# Patient Record
Sex: FEMALE | Race: WHITE | Employment: FULL TIME | URBAN - METROPOLITAN AREA
[De-identification: names, ages, dates, MRNs, and addresses within clinical notes are randomized per-mention and may not be internally consistent; named-entity substitution may affect disease eponyms.]

---

## 2017-05-19 ENCOUNTER — OFFICE VISIT (OUTPATIENT)
Dept: ONCOLOGY | Facility: CLINIC | Age: 37
End: 2017-05-19
Attending: GENETIC COUNSELOR, MS
Payer: COMMERCIAL

## 2017-05-19 DIAGNOSIS — Z80.3 FAMILY HISTORY OF MALIGNANT NEOPLASM OF BREAST: ICD-10-CM

## 2017-05-19 DIAGNOSIS — Z80.41 FAMILY HISTORY OF MALIGNANT NEOPLASM OF OVARY: ICD-10-CM

## 2017-05-19 DIAGNOSIS — Z80.0 FAMILY HISTORY OF COLON CANCER: ICD-10-CM

## 2017-05-19 DIAGNOSIS — Z80.41 FAMILY HISTORY OF MALIGNANT NEOPLASM OF OVARY: Primary | ICD-10-CM

## 2017-05-19 LAB — MISCELLANEOUS TEST: NORMAL

## 2017-05-19 PROCEDURE — 36415 COLL VENOUS BLD VENIPUNCTURE: CPT

## 2017-05-19 PROCEDURE — 96040 ZZH GENETIC COUNSELING, EACH 30 MINUTES: CPT | Mod: ZF | Performed by: GENETIC COUNSELOR, MS

## 2017-05-19 NOTE — PATIENT INSTRUCTIONS
Assessing Cancer Risk  Only about 5-10% of cancers are thought to be due to an inherited cancer susceptibility gene.    These families often have:    Several people with the same or related types of cancer    Cancers diagnosed at a young age (before age 50)    Individuals with more than one primary cancer    Multiple generations of the family affected with cancer    Some people may be candidates for genetic testing of more than one gene.  For these families, genetic testing using a multi-gene cancer panel may be offered.  These panels may test genes known to increase the risk for breast (and other) cancers: YONATAN, BRCA1, BRCA2, CDH1, CHEK2, PALB2, PTEN, and TP53.  The purpose of this handout is to serve as a brief summary of the high/moderate-risk breast cancer genes which have published clinical management guidelines for individuals who are found to carry a mutation.    Hereditary Breast and Ovarian Cancer Syndrome (HBOC)  A single mutation in one of the copies of BRCA1 or BRCA2 increases the risk for breast and ovarian cancer, among others.  The risk for pancreatic cancer and melanoma may also be slightly increased in some families.  The tables below list the chance that someone with a BRCA mutation would develop cancer in his or her lifetime1,2,3,4.          Women   Men     General Population BRCA+    General Population BRCA+   Breast  12% 40-80%  Breast <1% ~7%   Ovarian  1-2% 10-40%  Prostate 16% 20%     A person s ethnic background is also important to consider, as individuals of Ashkenazi Advent ancestry have a higher chance of having a BRCA gene mutation.  There are three BRCA mutations that occur more frequently in this population.    Li-Fraumeni Syndrome (LFS)  LFS is a cancer predisposition syndrome. Individuals with LFS are at an increased risk for developing cancer at a young age. The general lifetime risk for development of cancer is 50% by age 30 and 90% by age 60.  LFS is caused by a mutation in the  TP53 gene.  A single mutation in one of the copies of TP53 increases the risk for multiple cancers.     Core Cancers: Sarcomas, Breast, Brain, Lung, Leukemias/Lymphomas, Adrenocortical carcinomas  Other Cancers: Gastrointestinal, Thyroid, Skin, Genitourinary      Cowden Syndrome  Cowden syndrome is a hereditary condition that increases the risk for breast, thyroid, endometrial, and kidney cancer.  Cowden syndrome is caused by a mutation in the PTEN gene.  A single mutation in one of the copies of PTEN causes Cowden syndrome and increases cancer risk.  The table below shows the chance that someone with a PTEN mutation would develop cancer in their lifetime5,6.  Other benign features seen in some individuals with Cowden syndrome include benign skin lesions (facial papules, keratoses, lipomas), learning disability, autism, thyroid nodules, colon polyps, and larger head size.      Lifetime Cancer Risk   Cancer Type General Population Cowden Syndrome   Breast  12% 25-50%*   Thyroid  1% 35%   Renal 1-2% 35%   Endometrial  2-3% 28%   Colon 5% 9%   Melanoma 2-3% >5%     *One recent study found breast cancer risk to be increased to 85%    Hereditary Diffuse Gastric Cancer (HDGC)  Currently, one gene is known to cause hereditary diffuse gastric cancer: CDH1.  Individuals with HDGC are at increased risk for diffuse gastric cancer and lobular breast cancer. Of people diagnosed with HDGC, 30-50% have a mutation in the CDH1 gene.  This suggests there are likely other genes that may cause HDGC that have not been identified yet.      Lifetime Cancer Risks    General Pop HDGC    Diffuse Gastric  <1% ~80%   Breast 12% 39-52%     Additional Genes Associated with Increased Breast Cancer Risk  PALB2  Mutations in PALB2 have been shown to increase the risk of breast cancer up to 33-58% in some families; where individuals fall within this risk range is dependent upon family history.9 PALB2 mutations have also been associated with  increased risk for pancreatic cancer, although this risk has not been quantified yet.  Individuals who inherit two PALB2 mutations--one from their mother and one from their father--have a condition called Fanconi Anemia.  This rare autosomal recessive condition is associated with short stature, developmental delay, bone marrow failure, and increased risk for childhood cancers.    YONATAN  YONATAN is a moderate-risk breast cancer gene. Women who have a mutation in YONATAN can have between a 2-4 fold increased risk for breast cancer compared to the general population.10  YONATAN mutations have also been associated with increased risk for pancreatic cancer, however an estimate of this cancer risk is not well understood.11  Individuals who inherit two YONATAN mutations have a condition called ataxia-telangiectasia (AT).  This rare autosomal recessive condition affects the nervous system and immune system, and is associated with progressive cerebellar ataxia beginning in childhood.  Individuals with ataxia-telangiectasia often have a weakened immune system and have an increased risk for childhood cancers.           CHEK2   CHEK2 is a moderate-risk breast cancer gene.  Women who have a mutation in CHEK2 have around a 2-fold increased risk for breast cancer compared to the general population, and this risk may be higher depending upon family history.12,13,14  Mutations in CHEK2 have also been shown to increase the risk of a number of other cancers, including colon and prostate, however these cancer risks are currently not well understood.         Inheritance   All of the genes reviewed above are inherited in an autosomal dominant pattern.  This means that if a parent has a mutation, each of his or her children will have a 50% chance of inheriting that same mutation.  Therefore, each child--male or female--would have a 50% chance of being at increased risk for developing cancer.      Image obtained from [employer], 2013      Mutations in some genes can occur de ed, which means that a person s mutation occurred for the first time in them and was not inherited from a parent.  Now that they have the mutation, however, it can be passed on to future generations.    Genetic Testing  Genetic testing involves a blood test and will look for any harmful mutations within genes that are associated with increased cancer risk.  If possible, it is recommended that the person(s) who has had cancer be tested before other family members.  That person will give us the most useful information about whether or not a specific gene is associated with the cancer in the family.    Results  There are three possible results of genetic testing:    Positive--a harmful mutation was identified in one or more of the genes    Negative--no mutation was identified in any of the genes on this panel    Variant of unknown significance--a variation in one of the genes was identified, but it is unclear how this impacts cancer risk in the family    Advantages and Disadvantages  There are advantages and disadvantages to genetic testing.  Advantages    May clarify your cancer risk    Can help you make medical decisions    May explain the cancers in your family    May give useful information to your family members (if you share your results)    Disadvantages    Possible negative emotional impact of learning about inherited cancer risk    Uncertainty in interpreting a negative test result in some situations    Possible genetic discrimination concerns (see below)    Genetic Information Nondiscrimination Act (DMITRI)  DMITRI is a federal law that protects individuals from health insurance or employment discrimination based on a genetic test result alone.  Although rare, there are currently no legal discrimination protections in terms of life insurance, long term care, or disability insurances.  Visit the National Human Genome Research [place] genome.gov/17708839 to learn  more.    Reducing Cancer Risk  Each of the genes listed within this handout have nationally recognized cancer screening guidelines that would be recommended for individuals who test positive.  In addition to increased cancer screening, surgeries may be offered or recommended to reduce cancer risk.  Recommendations are based upon an individual s genetic test result as well as their personal and family history of cancer.    Questions to Think About Regarding Genetic Testing    What effect will the test result have on me and my relationship with my family members if I have an inherited gene mutation?  If I don t have a gene mutation?    Should I share my test results, and how will my family react to this news, which may also affect them?    Are my children ready to learn new information that may one day affect their own health?      Resources  FORCE: Facing Our Risk of Cancer Empowered facingourrisk.org   Bright Pink bebrightpink.org   Li-Fraumeni Syndrome Association lfsassociation.org   PTEN World PTENworld.com   No stomach for cancer, Inc. nostomachforcancer.org   Stomach cancer relief network scrnet.org   Collaborative Group of the Americas on Inherited Colorectal Cancer (CGA) cgaicc.com    Cancer Care cancercare.org   American Cancer Society (ACS) cancer.org   National Cancer Angie (NCI) cancer.gov     Cancer Risk Management Program [phone] ([phone])  ? Bere Cheryl, MS, [facility] [place]  [phone]  ? Kelsie Tyrone, MS, [facility] [place]  [phone]  ? Mali Brown, MS, [facility] [place]  [phone]  ? Qing Lowery, MS, [facility] [place]  [phone]  ? Griffin Booth, MS, [facility] [place]  [phone]    References  1. Rose Mercado PDP, Sharmin S, Loan MARIE, Mateus JE, Melchor JL, Tieraman N, Nelida H, Heather O, Cindi A, Ezekiel B, Souleymane P, Lizzette S, Ariela DM, North N, Bobbi E, April H, Suresh E, Leticia J, Cass J, Claudia B, Tulinius H, Thorlacius S, Eerola H, Bryan H, Jca K, Crow OP. Average risks of breast and ovarian  cancer associated with BRCA1 or BRCA2 mutations detected in case series unselected for family history: a combined analysis of 222 studies. Am J Hum Mirtha. 2003;72:1117-30.  2. Blanca N, Lluvia MATSON, Christophe G.  BRCA1 and BRCA2 Hereditary Breast and Ovarian Cancer. Gene Reviews online. 2013.  3. Aristeo YC, Shai S, Gerald G, Dahiana S. Breast cancer risk among male BRCA1 and BRCA2 mutation carriers. J Natl Cancer Inst. 2007;99:1811-4.  4. Lalo MCGUIRE, Vanessa I, Nemesio J, Gomez E, Monik ER, Amaya F. Risk of breast cancer in male BRCA2 carriers. J Med Mirtha. 2010;47:710-1.  5. Dada MH, Manolo J, Logan J, Giovanni LINO, Sonido MS, Haily C. Lifetime cancer risks in individuals with germline PTEN mutations. Clin Cancer Res. 2012;18:400-7.  6. Teddyki R. Cowden Syndrome: A Critical Review of the Clinical Literature. J Mirtha . 2009:18:13-27.  7. National Comprehensive Cancer Network. Clinical practice guidelines in oncology, colorectal cancer screening. Available online (registration required). 2013.  8. National Cancer [place]. SEER Cancer Stat Fact Sheets.  December 2013.  9. Sophia LIVINGSTON, et al. Breast-Cancer Risk in Families with Mutations in PALB2. NEJM. 2014; 371(6):497-506.  10. Leandro GREEN, Bhupendra D, Ale S, Clarita P, Radhai T, Ole M, Denzel B, Johnny H, Glenn R, Yolanda K, Roxy L, Lalo MCGUIRE, Ariela D, Indra DF, Rc MR, The Breast Cancer Susceptibility Collaboration ([place]) & Josue N. YONATAN mutations that cause ataxia-telangiectasia are breast cancer susceptibility alleles. Nature Genetics. 2006;38:873-875  11. Alfa N , Yeny Y, Ayala GUAN, Guillermina L, Eduardo GM , Josias ML, Regina S, Boucher AG, Syngal S, Shama ML, Stuart J , Antony R, Ephraim SZ, Laura JR, Pura VE, Tor M, Vogelstein B, Minda N, Kate RH, Gia KW, and Chandrakant AP. YONATAN mutations in patients with hereditary pancreatic cancer. Cancer Discover. 2012;2:41-46  12. CHEK2 Breast Cancer Case-Control Consortium.  CHEK2*1100delC and susceptibility to breast cancer: A collaborative analysis involving 10,860 breast cancer cases and 9,065 controls from 10 studies. Am J Hum Mirtha, 74 (2004), pp. 1913-5100  13. Tobias T, Maricruz S, Al K, et al. Spectrum of Mutations in BRCA1, BRCA2, CHEK2, and TP53 in Families at High Risk of Breast Cancer. SONDRA. 2006;295(12):4573-3441.   14. Barbara NIETO, Yina SHAHID, Rene GREEN, et al. Risk of breast cancer in women with a CHEK2 mutation with and without a family history of breast cancer. J Clin Oncol. 2011;29:6648-8943

## 2017-05-19 NOTE — LETTER
5/19/2017       RE: Krystyna Smith  [address] [facility] [address]     Dear Colleague,    Thank you for referring your patient, Krystyna Smith, to the [facility]. Please see a copy of my visit note below.    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 She is here today to review this history and available genetic testing options.    Personal History:  Krystyna is a 37 year old female.  She does not have any personal history of cancer.      She had her first menstrual period at age 14, her first child at age 25, and is premenopausal. Krystyna has her ovaries, fallopian tubes and uterus in place, and she has not had ovarian cancer screening to date.  She reports 10 years of oral contraceptive use, approximately 7-8 years of IUD use, and no use of hormone replacement therapy.      Her most recent clinical breast exam was on 09/09/2016 and she has not had mammograms. She has not had a colonoscopy. She does not regularly do any other cancer screening at this time.  Krystyna reported she quit smoking in 2005 and that he only has occasional use of alcohol.     Family History: (Please see scanned pedigree for detailed family history information) Per Krystyna, her family history with regards to cancer is significant for the following:    Krystyna's mother Brandt was diagnosed with ovarian cancer at age 62 and passed away last year at age 64. Brandt underwent surgeries and received chemotherapy. In August of 2015, Brandt received the BRCA MyRisk analysis genetic test, which consisted of 25 genes (BRCA1, BRCA2, PTEN, TP53, CDH1, STK11, PALB2, CHEK2, YONATAN, NBN, BARD1, BRIP1, RAD51C, MLH1, MSH2, MSH6, PMS2,  EPCAM, APC, MUTYH, CDKN2A, CDK4, BMPR1A, SMAD4, and RAD51D).    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 This information was found in the letter from Brandt's genetic counselor.     Krystyna's maternal aunt was diagnosed with breast cancer at age 74. She underwent double mastectomy and chemotherapy. She is reported to have had hysterectomy done. She reportedly had fertility issues and never had children. She is also reported to carry the same YONATAN c.2880del mutation.    Krystyna's maternal grandfather passed away at age 52 due to colon cancer.     Her maternal and paternal ethnicity is [place].  There is no known Ashkenazi Mormon ancestry on either side of her family. There is no reported consanguinity.    Discussion:    Based on Krystyna's mother's genetic test results, Krystyna has a 50% chance of also carrying the same genetic change in the YONATAN gene.    Based on this, Krystyna meets current National Comprehensive Cancer Network (NCCN) criteria for genetic testing of the familial YONATAN mutation.      We discussed the natural history and genetics of breast cancer, as well as YONATAN mutations.   - We reviewed that YONATAN is a moderate-risk breast cancer gene.  The risk for breast cancer in women with an YONATAN mutation are estimated to be at a 2-4 fold increased risk for breast cancer compared to the general population.  This is approximately 24-48%, based on the population risk for breast cancer of ~12%.  - We discussed the association of YONATAN mutations with increased risk for pancreatic cancer; however data is still limited in this area.  No screening recommendations have been made for YONATAN carriers regarding pancreatic cancer at this time.    - We also discussed that YONATAN mutations are generally not associated with ovarian cancer risk. Hence, the familial YONATAN mutation in Krystyna's family may not fully explain the diagnosis of ovarian cancer in Krystyna's mother.     We  discussed the implications for both positive and negative test results for Krystyna.    If testing comes back positive, Krystyna will be recommended to follow the screening recommendations for women with YONATAN mutations as published by the National Comprehensive Cancer Network (NCCN). omen with YONATAN mutations qualify for high risk screening, which involves breast MRI in addition to mammogram.  Double mastectomy may be considered based on family history.    We also discussed what a positive test result would mean for her son as well as reproductive implications of having an YONATAN mutation, as it pertains to having a child with ataxia-telangiectasia.  Ataxia-telangiectasia is a rare autosomal recessive disorder that typically presents in childhood, and affects the nervous system, immune system, and other systems. This condition occurs when a mother and father both have a mutation in the YONATAN gene and pass it on to a child.  Individuals with this disorder have trouble with balance and coordinating movements (ataxia).  Affected individuals also have widened blood vessels called telangiectasias. People with ataxia-telangiectasia have an increased risk of developing childhood cancers.     We decided to revisit ataxia-telangiectasia in more detail if Krystyna's results come back positive.     If testing comes back negative, Krystyna would not be at an increased risk for YONATAN-associated cancers. However, it was discussed again that as ovarian cancer isn't a cancer generally associated with YONATAN mutations, appropriate ovarian cancer screening may need to be discussed with her primary care provider. If testing comes back negative, as these mutations do not skip generations, Krystyna cannot pass on the familial YONATAN mutation to her son based on a negative test result.     A detailed handout regarding breast and ovarian cancer and the information we discussed was provided to Krystyna at the end of our appointment today and can be found  in the after visit summary.  Topics included: inheritance pattern, cancer risks, cancer screening recommendations, and also risks, benefits and limitations of testing.    Medical Management: The information from genetic testing may determine additional cancer screening recommendations (i.e. mammogram and breast MRI, more frequent colonoscopies, annual dermatologic exams, etc.) as well as options for risk reducing surgeries (i.e. bilateral mastectomy, surgery to remove the ovaries and/or uterus, etc.) for Krystyna and her relatives.  These recommendations will be discussed in detail once genetic testing is completed.    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  2) This information should be available in 4-6 weeks.  3) Krystyna will return to clinic to discuss the results    Face to face time: 45 minutes    Griffin Booth MS, [facility] [place]  Certified Genetic Counselor  T: [phone]  F: [phone]

## 2017-05-19 NOTE — PROGRESS NOTES
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 She is here today to review this history and available genetic testing options.    Personal History:  Krystyna is a 37 year old female.  She does not have any personal history of cancer.      She had her first menstrual period at age 14, her first child at age 25, and is premenopausal. Krystyna has her ovaries, fallopian tubes and uterus in place, and she has not had ovarian cancer screening to date.  She reports 10 years of oral contraceptive use, approximately 7-8 years of IUD use, and no use of hormone replacement therapy.      Her most recent clinical breast exam was on 09/09/2016 and she has not had mammograms. She has not had a colonoscopy. She does not regularly do any other cancer screening at this time.  Krystyna reported she quit smoking in 2005 and that he only has occasional use of alcohol.     Family History: (Please see scanned pedigree for detailed family history information) Per Krystyna, her family history with regards to cancer is significant for the following:    Krystyna's mother Brandt was diagnosed with ovarian cancer at age 62 and passed away last year at age 64. Brandt underwent surgeries and received chemotherapy. In August of 2015, Brandt received the BRCA MyRisk analysis genetic test, which consisted of 25 genes (BRCA1, BRCA2, PTEN, TP53, CDH1, STK11, PALB2, CHEK2, YONATAN, NBN, BARD1, BRIP1, RAD51C, MLH1, MSH2, MSH6, PMS2, EPCAM, APC, MUTYH, CDKN2A, CDK4, BMPR1A, SMAD4, and RAD51D).    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 This information was found in the letter from Brandt's genetic counselor.     Krystyna's  maternal aunt was diagnosed with breast cancer at age 74. She underwent double mastectomy and chemotherapy. She is reported to have had hysterectomy done. She reportedly had fertility issues and never had children. She is also reported to carry the same YONATAN c.2880del mutation.    Krystyna's maternal grandfather passed away at age 52 due to colon cancer.     Her maternal and paternal ethnicity is [place].  There is no known Ashkenazi Zoroastrianism ancestry on either side of her family. There is no reported consanguinity.    Discussion:    Based on Krystyna's mother's genetic test results, Krystyna has a 50% chance of also carrying the same genetic change in the YONATAN gene.    Based on this, Krystyna meets current National Comprehensive Cancer Network (NCCN) criteria for genetic testing of the familial YONATAN mutation.      We discussed the natural history and genetics of breast cancer, as well as YONATAN mutations.   - We reviewed that YONATAN is a moderate-risk breast cancer gene.  The risk for breast cancer in women with an YONATAN mutation are estimated to be at a 2-4 fold increased risk for breast cancer compared to the general population.  This is approximately 24-48%, based on the population risk for breast cancer of ~12%.  - We discussed the association of YONATAN mutations with increased risk for pancreatic cancer; however data is still limited in this area.  No screening recommendations have been made for YONATAN carriers regarding pancreatic cancer at this time.    - We also discussed that YONATAN mutations are generally not associated with ovarian cancer risk. Hence, the familial YONATAN mutation in Krystyna's family may not fully explain the diagnosis of ovarian cancer in Krystyna's mother.     We discussed the implications for both positive and negative test results for Krystyna.    If testing comes back positive, Krystyna will be recommended to follow the screening recommendations for women with YONATAN mutations as published by the National  Comprehensive Cancer Network (NCCN). omen with YONATAN mutations qualify for high risk screening, which involves breast MRI in addition to mammogram.  Double mastectomy may be considered based on family history.    We also discussed what a positive test result would mean for her son as well as reproductive implications of having an YONATAN mutation, as it pertains to having a child with ataxia-telangiectasia.  Ataxia-telangiectasia is a rare autosomal recessive disorder that typically presents in childhood, and affects the nervous system, immune system, and other systems. This condition occurs when a mother and father both have a mutation in the YONATAN gene and pass it on to a child.  Individuals with this disorder have trouble with balance and coordinating movements (ataxia).  Affected individuals also have widened blood vessels called telangiectasias. People with ataxia-telangiectasia have an increased risk of developing childhood cancers.     We decided to revisit ataxia-telangiectasia in more detail if Krystyna's results come back positive.     If testing comes back negative, Krystyna would not be at an increased risk for YONATAN-associated cancers. However, it was discussed again that as ovarian cancer isn't a cancer generally associated with YONATAN mutations, appropriate ovarian cancer screening may need to be discussed with her primary care provider. If testing comes back negative, as these mutations do not skip generations, Krystyna cannot pass on the familial YONATAN mutation to her son based on a negative test result.     A detailed handout regarding breast and ovarian cancer and the information we discussed was provided to Krystyna at the end of our appointment today and can be found in the after visit summary.  Topics included: inheritance pattern, cancer risks, cancer screening recommendations, and also risks, benefits and limitations of testing.    Medical Management: The information from genetic testing may determine  additional cancer screening recommendations (i.e. mammogram and breast MRI, more frequent colonoscopies, annual dermatologic exams, etc.) as well as options for risk reducing surgeries (i.e. bilateral mastectomy, surgery to remove the ovaries and/or uterus, etc.) for Krystyna and her relatives.  These recommendations will be discussed in detail once genetic testing is completed.    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  2) This information should be available in 4-6 weeks.  3) Krystyna will return to clinic to discuss the results    Face to face time: 45 minutes    Griffin Booth MS, [facility]  Certified Genetic Counselor  T: [phone]  F: [phone]

## 2017-05-19 NOTE — MR AVS SNAPSHOT
After Visit Summary   5/19/2017    Krystyna Smith    MRN: [id number]           Patient Information     Date Of Birth          1980        Visit Information        Provider Department      5/19/2017 2:15 PM Griffin Booth GC;  [address] CONSULT [facility]        Care Instructions      Assessing Cancer Risk  Only about 5-10% of cancers are thought to be due to an inherited cancer susceptibility gene.    These families often have:    Several people with the same or related types of cancer    Cancers diagnosed at a young age (before age 50)    Individuals with more than one primary cancer    Multiple generations of the family affected with cancer    Some people may be candidates for genetic testing of more than one gene.  For these families, genetic testing using a multi-gene cancer panel may be offered.  These panels may test genes known to increase the risk for breast (and other) cancers: YONATAN, BRCA1, BRCA2, CDH1, CHEK2, PALB2, PTEN, and TP53.  The purpose of this handout is to serve as a brief summary of the high/moderate-risk breast cancer genes which have published clinical management guidelines for individuals who are found to carry a mutation.    Hereditary Breast and Ovarian Cancer Syndrome (HBOC)  A single mutation in one of the copies of BRCA1 or BRCA2 increases the risk for breast and ovarian cancer, among others.  The risk for pancreatic cancer and melanoma may also be slightly increased in some families.  The tables below list the chance that someone with a BRCA mutation would develop cancer in his or her lifetime1,2,3,4.          Women   Men     General Population BRCA+    General Population BRCA+   Breast  12% 40-80%  Breast <1% ~7%   Ovarian  1-2% 10-40%  Prostate 16% 20%     A person s ethnic background is also important to consider, as individuals of Ashkenazi Sikhism ancestry have a higher chance of having a BRCA gene mutation.  There are three BRCA mutations that  occur more frequently in this population.    Li-Fraumeni Syndrome (LFS)  LFS is a cancer predisposition syndrome. Individuals with LFS are at an increased risk for developing cancer at a young age. The general lifetime risk for development of cancer is 50% by age 30 and 90% by age 60.  LFS is caused by a mutation in the TP53 gene.  A single mutation in one of the copies of TP53 increases the risk for multiple cancers.     Core Cancers: Sarcomas, Breast, Brain, Lung, Leukemias/Lymphomas, Adrenocortical carcinomas  Other Cancers: Gastrointestinal, Thyroid, Skin, Genitourinary      Cowden Syndrome  Cowden syndrome is a hereditary condition that increases the risk for breast, thyroid, endometrial, and kidney cancer.  Cowden syndrome is caused by a mutation in the PTEN gene.  A single mutation in one of the copies of PTEN causes Cowden syndrome and increases cancer risk.  The table below shows the chance that someone with a PTEN mutation would develop cancer in their lifetime5,6.  Other benign features seen in some individuals with Cowden syndrome include benign skin lesions (facial papules, keratoses, lipomas), learning disability, autism, thyroid nodules, colon polyps, and larger head size.      Lifetime Cancer Risk   Cancer Type General Population Cowden Syndrome   Breast  12% 25-50%*   Thyroid  1% 35%   Renal 1-2% 35%   Endometrial  2-3% 28%   Colon 5% 9%   Melanoma 2-3% >5%     *One recent study found breast cancer risk to be increased to 85%    Hereditary Diffuse Gastric Cancer (HDGC)  Currently, one gene is known to cause hereditary diffuse gastric cancer: CDH1.  Individuals with HDGC are at increased risk for diffuse gastric cancer and lobular breast cancer. Of people diagnosed with HDGC, 30-50% have a mutation in the CDH1 gene.  This suggests there are likely other genes that may cause HDGC that have not been identified yet.      Lifetime Cancer Risks    General Pop HDGC    Diffuse Gastric  <1% ~80%   Breast 12%  39-52%     Additional Genes Associated with Increased Breast Cancer Risk  PALB2  Mutations in PALB2 have been shown to increase the risk of breast cancer up to 33-58% in some families; where individuals fall within this risk range is dependent upon family history.9 PALB2 mutations have also been associated with increased risk for pancreatic cancer, although this risk has not been quantified yet.  Individuals who inherit two PALB2 mutations--one from their mother and one from their father--have a condition called Fanconi Anemia.  This rare autosomal recessive condition is associated with short stature, developmental delay, bone marrow failure, and increased risk for childhood cancers.    YONATAN  YONATAN is a moderate-risk breast cancer gene. Women who have a mutation in YONATAN can have between a 2-4 fold increased risk for breast cancer compared to the general population.10  YNOATAN mutations have also been associated with increased risk for pancreatic cancer, however an estimate of this cancer risk is not well understood.11  Individuals who inherit two YONATAN mutations have a condition called ataxia-telangiectasia (AT).  This rare autosomal recessive condition affects the nervous system and immune system, and is associated with progressive cerebellar ataxia beginning in childhood.  Individuals with ataxia-telangiectasia often have a weakened immune system and have an increased risk for childhood cancers.           CHEK2   CHEK2 is a moderate-risk breast cancer gene.  Women who have a mutation in CHEK2 have around a 2-fold increased risk for breast cancer compared to the general population, and this risk may be higher depending upon family history.12,13,14  Mutations in CHEK2 have also been shown to increase the risk of a number of other cancers, including colon and prostate, however these cancer risks are currently not well understood.         Inheritance   All of the genes reviewed above are inherited in an autosomal dominant  pattern.  This means that if a parent has a mutation, each of his or her children will have a 50% chance of inheriting that same mutation.  Therefore, each child--male or female--would have a 50% chance of being at increased risk for developing cancer.      Image obtained from Genetics Home Reference, 2013     Mutations in some genes can occur de ed, which means that a person s mutation occurred for the first time in them and was not inherited from a parent.  Now that they have the mutation, however, it can be passed on to future generations.    Genetic Testing  Genetic testing involves a blood test and will look for any harmful mutations within genes that are associated with increased cancer risk.  If possible, it is recommended that the person(s) who has had cancer be tested before other family members.  That person will give us the most useful information about whether or not a specific gene is associated with the cancer in the family.    Results  There are three possible results of genetic testing:    Positive--a harmful mutation was identified in one or more of the genes    Negative--no mutation was identified in any of the genes on this panel    Variant of unknown significance--a variation in one of the genes was identified, but it is unclear how this impacts cancer risk in the family    Advantages and Disadvantages  There are advantages and disadvantages to genetic testing.  Advantages    May clarify your cancer risk    Can help you make medical decisions    May explain the cancers in your family    May give useful information to your family members (if you share your results)    Disadvantages    Possible negative emotional impact of learning about inherited cancer risk    Uncertainty in interpreting a negative test result in some situations    Possible genetic discrimination concerns (see below)    Genetic Information Nondiscrimination Act (DMITRI)  DMITRI is a federal law that protects individuals from health  insurance or employment discrimination based on a genetic test result alone.  Although rare, there are currently no legal discrimination protections in terms of life insurance, long term care, or disability insurances.  Visit the National Human Genome Research [place] genome.gov/16706695 to learn more.    Reducing Cancer Risk  Each of the genes listed within this handout have nationally recognized cancer screening guidelines that would be recommended for individuals who test positive.  In addition to increased cancer screening, surgeries may be offered or recommended to reduce cancer risk.  Recommendations are based upon an individual s genetic test result as well as their personal and family history of cancer.    Questions to Think About Regarding Genetic Testing    What effect will the test result have on me and my relationship with my family members if I have an inherited gene mutation?  If I don t have a gene mutation?    Should I share my test results, and how will my family react to this news, which may also affect them?    Are my children ready to learn new information that may one day affect their own health?      Resources  FORCE: Facing Our Risk of Cancer Empowered facingourrisk.org   Bright Pink bebrightpink.org   Li-Fraumeni Syndrome Association lfsassociation.org   PTEN World PTENworld.Fugoo   No stomach for cancer, Inc. nostomachforcancer.org   Stomach cancer relief network scrnet.org   Collaborative Group of the Americas on Inherited Colorectal Cancer (CGA) cgaicc.com    Cancer Care cancercare.org   American Cancer Society (ACS) cancer.org   National Cancer [place] (NCI) cancer.gov     Cancer Risk Management Program [phone] ([phone])  ? Bere Luna, MS, [facility]  [phone]  ? Kelsie Hansen, MS, [facility]  [phone]  ? Mali Brown, MS, [facility]  [phone]  ? Qing Lowery, MS, [facility]  [phone]  ? Griffin Booth, MS, [facility]  [phone]    References  1. Rose Mercado PDP, Sharmin S, Loan MARIE,  Mateus JE, Melchor JL, Srikanth N, Nelida H, Heather O, Cindi A, Ezekiel B, Souleymane P, Lizzette S, Ariela DM, Kat N, Bobbi E, April H, uSresh E, Leticia J, Cass J, Claudia B, Tulinius H, Thorlacius S, Eerola H, Nevanlinna H, Jac K, Crow OP. Average risks of breast and ovarian cancer associated with BRCA1 or BRCA2 mutations detected in case series unselected for family history: a combined analysis of 222 studies. Am J Hum Mirtha. 2003;72:1117-30.  2. Blanca N, Lluvia M, Christophe G.  BRCA1 and BRCA2 Hereditary Breast and Ovarian Cancer. Gene Reviews online. 2013.  3. Aristeo YC, Shai S, Gerald G, Talbot S. Breast cancer risk among male BRCA1 and BRCA2 mutation carriers. J Natl Cancer Inst. 2007;99:1811-4.  4. Lalo MCGUIRE, Vanessa I, Nemesio J, Gomez E, Monik ER, Amaya F. Risk of breast cancer in male BRCA2 carriers. J Med Mirtha. 2010;47:710-1.  5. Garland MH, Manolo J, Logan J, Giovanni LA, Sonido MS, Haily C. Lifetime cancer risks in individuals with germline PTEN mutations. Clin Cancer Res. 2012;18:400-7.  6. Pilarski R. Cowden Syndrome: A Critical Review of the Clinical Literature. J Mirtha . 2009:18:13-27.  7. National Comprehensive Cancer Network. Clinical practice guidelines in oncology, colorectal cancer screening. Available online (registration required). 2013.  8. National Cancer [place]. SEER Cancer Stat Fact Sheets.  December 2013.  9. Sophia LIVINGSTON, et al. Breast-Cancer Risk in Families with Mutations in PALB2. NEJM. 2014; 371(6):497-506.  10. Leandro GREEN, Bhupendra SHAHID, Ale S, Clarita P, Holden T, Ole M, Denzel B, Johnny H, Glenn R, Yolanda K, Roxy L, Lalo MCGUIRE, Ariela SHAHID, Indra DF, Rc MR, The Breast Cancer Susceptibility Collaboration (UK) & Josue ROWLEY. YONATAN mutations that cause ataxia-telangiectasia are breast cancer susceptibility alleles. Nature Genetics. 2006;38:873-875  11. Alfa N , Yeny Y, Ayala J, Guillermina L, Eduardo JIMÉNEZ , Josias ML, Regina S, Sander WISE,  Tiana S, Shama ML, Stuart J , Antony R, Ephraim SZ, Laura JR, Puar VE, Tor M, Vogelstein B, Minda N, Kate RH, Gia KW, and Chandrakant AP. YONATAN mutations in patients with hereditary pancreatic cancer. Cancer Discover. 2012;2:41-46  12. CHEK2 Breast Cancer Case-Control Consortium. CHEK2*1100delC and susceptibility to breast cancer: A collaborative analysis involving 10,860 breast cancer cases and 9,065 controls from 10 studies. Am J Hum Mirtha, 74 (2004), pp. 4140-5678  13. Tobias T, Maricruz S, Al K, et al. Spectrum of Mutations in BRCA1, BRCA2, CHEK2, and TP53 in Families at High Risk of Breast Cancer. SONDRA. 2006;295(12):7677-3234.   14. Barbara C, Yina D, Rene A, et al. Risk of breast cancer in women with a CHEK2 mutation with and without a family history of breast cancer. J Clin Oncol. 2011;29:7886-9077            Follow-ups after your visit        Your next 10 appointments already scheduled     May 19, 2017  3:45 PM [facility]   T[employer] Lab Draw with  [employer] LAB DRAW   [facility] Lab Draw ([facility] [[email or web address]] Surgery [employer])    [address] [place] [id number] [phone]              Who to contact     If you have questions or need follow up information about today's clinic visit or your schedule please contact [facility] directly at [phone].  Normal or non-critical lab and imaging results will be communicated to you by MyChart, letter or phone within 4 business days after the clinic has received the results. If you do not hear from us within 7 days, please contact the clinic through MyChart or phone. If you have a critical or abnormal lab result, we will notify you by phone as soon as possible.  Submit refill requests through [employer] or call your pharmacy and they will forward the refill request to us. Please allow 3 business days for your refill to be completed.          Additional Information About Your Visit         [employer] Information     [employer] gives you secure access to your electronic health record. If you see a primary care provider, you can also send messages to your care team and make appointments. If you have questions, please call your primary care clinic.  If you do not have a primary care provider, please call [phone] and they will assist you.        Care EveryWhere ID     This is your Care EveryWhere ID. This could be used by other organizations to access your [place] medical records  [id number]         Blood Pressure from Last 3 Encounters:   09/09/16 115/79   02/22/16 104/68   11/13/15 104/62    Weight from Last 3 Encounters:   09/09/16 49.3 kg (108 lb 9.6 oz)   02/22/16 47.5 kg (104 lb 12.8 oz)   11/13/15 46.3 kg (102 lb)              Today, you had the following     No orders found for display       Primary Care Provider Office Phone # Fax #    CORINNE Luque [facility] [phone]       [facility] [address] [place] [address]        Thank you!     Thank you for choosing [facility]  for your care. Our goal is always to provide you with excellent care. Hearing back from our patients is one way we can continue to improve our services. Please take a few minutes to complete the written survey that you may receive in the mail after your visit with us. Thank you!             Your Updated Medication List - Protect others around you: Learn how to safely use, store and throw away your medicines at www.disposemymeds.org.          This list is accurate as of: 5/19/17  3:13 PM.  Always use your most recent med list.                   Brand Name Dispense Instructions for use    MULTIVITAMIN PO          NO ACTIVE MEDICATIONS      .       UNABLE TO FIND      MEDICATION NAME: a drink with many vitamins

## 2017-05-19 NOTE — NURSING NOTE
Chief Complaint   Patient presents with     Blood Draw     labs only via vpt in left AC   See flowsheets.  Margo Blank, CMA

## 2017-05-19 NOTE — LETTER
Cancer Risk Management  Program Locations    [facility]  [facility]  [facility]  Mailing Address  Cancer Risk Management Program  [facility]  [address]  [place] [address]    New patient appointments  [phone]  May 22, 2017    Krystyna MELTON  [place] [address]      Dear Krystyna,    It was a pleasure meeting with you at [facility] on 05/19/2017.  Here is a copy of the progress note from your recent genetic counseling visit to the Cancer Risk Management Program.  If you have any additional questions, please feel free to call.    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 She is here today to review this history and available genetic testing options.    Personal History:  Krystyna is a 37 year old female.  She does not have any personal history of cancer.      She had her first menstrual period at age 14, her first child at age 25, and is premenopausal. Krystyna has her ovaries, fallopian tubes and uterus in place, and she has not had ovarian cancer screening to date.  She reports 10 years of oral contraceptive use, approximately 7-8 years of IUD use, and no use of hormone replacement therapy.      Her most recent clinical breast exam was on 09/09/2016 and she has not had mammograms. She has not had a colonoscopy. She does not regularly do any other cancer screening at this time.  Krystyna reported she quit smoking in 2005 and that he only has occasional use of alcohol.     Family History:  (Please see scanned pedigree for detailed family history information) Per Krystyna, her family history with regards to cancer is significant for the following:    Krystyna's mother Brandt was diagnosed with ovarian cancer at age 62 and passed away last year at age 64. Brandt underwent surgeries and received chemotherapy. In August of 2015, Brandt received the BRCA Geo[employer] analysis genetic test, which consisted of 25 genes (BRCA1, BRCA2, PTEN, TP53, CDH1, STK11, PALB2, CHEK2, YONATAN, NBN, BARD1, BRIP1, RAD51C, MLH1, MSH2, MSH6, PMS2, EPCAM, APC, MUTYH, CDKN2A, CDK4, BMPR1A, SMAD4, and RAD51D).    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 This information was found in the letter from Brandt's genetic counselor.     Krystyna's maternal aunt was diagnosed with breast cancer at age 74. She underwent double mastectomy and chemotherapy. She is reported to have had hysterectomy done. She reportedly had fertility issues and never had children. She is also reported to carry the same YONATAN c.2880del mutation.    Krystyna's maternal grandfather passed away at age 52 due to colon cancer.     Her maternal and paternal ethnicity is Chinese/Citizen of the [place].  There is no known Ashkenazi Confucianism ancestry on either side of her family. There is no reported consanguinity.    Discussion:    Based on Krystyna's mother's genetic test results, Krystyna has a 50% chance of also carrying the same genetic change in the YONATAN gene.    Based on this, Krystyna meets current National Comprehensive Cancer Network (NCCN) criteria for genetic testing of the familial YONATAN mutation.      We discussed the natural history and genetics of breast cancer, as well as YONATAN mutations.   - We reviewed that YONATAN is a moderate-risk breast cancer gene.  The risk for breast cancer in women with an YONATAN mutation are estimated to be at a 2-4 fold increased risk for breast cancer compared to the general population.  This is approximately 24-48%,  based on the population risk for breast cancer of ~12%.  - We discussed the association of YONATAN mutations with increased risk for pancreatic cancer; however data is still limited in this area.  No screening recommendations have been made for YONATAN carriers regarding pancreatic cancer at this time.    - We also discussed that YONATAN mutations are generally not associated with ovarian cancer risk. Hence, the familial YONATAN mutation in Krystyna's family may not fully explain the diagnosis of ovarian cancer in Krystyna's mother.     We discussed the implications for both positive and negative test results for Krystyna.    If testing comes back positive, Krystyna will be recommended to follow the screening recommendations for women with YONATAN mutations as published by the National Comprehensive Cancer Network (NCCN). omen with YONATAN mutations qualify for high risk screening, which involves breast MRI in addition to mammogram.  Double mastectomy may be considered based on family history.    We also discussed what a positive test result would mean for her son as well as reproductive implications of having an YONATAN mutation, as it pertains to having a child with ataxia-telangiectasia.  Ataxia-telangiectasia is a rare autosomal recessive disorder that typically presents in childhood, and affects the nervous system, immune system, and other systems. This condition occurs when a mother and father both have a mutation in the YONATAN gene and pass it on to a child.  Individuals with this disorder have trouble with balance and coordinating movements (ataxia).  Affected individuals also have widened blood vessels called telangiectasias. People with ataxia-telangiectasia have an increased risk of developing childhood cancers.     We decided to revisit ataxia-telangiectasia in more detail if Krystyna's results come back positive.     If testing comes back negative, Krystyna would not be at an increased risk for YONATAN-associated cancers. However, it was  discussed again that as ovarian cancer isn't a cancer generally associated with YONATAN mutations, appropriate ovarian cancer screening may need to be discussed with her primary care provider. If testing comes back negative, as these mutations do not skip generations, Krystyna cannot pass on the familial YONATAN mutation to her son based on a negative test result.     A detailed handout regarding breast and ovarian cancer and the information we discussed was provided to Krystyna at the end of our appointment today and can be found in the after visit summary.  Topics included: inheritance pattern, cancer risks, cancer screening recommendations, and also risks, benefits and limitations of testing.    Medical Management: The information from genetic testing may determine additional cancer screening recommendations (i.e. mammogram and breast MRI, more frequent colonoscopies, annual dermatologic exams, etc.) as well as options for risk reducing surgeries (i.e. bilateral mastectomy, surgery to remove the ovaries and/or uterus, etc.) for Krystyna and her relatives.  These recommendations will be discussed in detail once genetic testing is completed.    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  2) This information should be available in 4-6 weeks.  3) Krystyna will return to clinic to discuss the results    Face to face time: 45 minutes    Griffin Booth MS, [facility]  Certified Genetic Counselor  T: [phone]  F: [phone]

## 2017-06-16 ENCOUNTER — OFFICE VISIT (OUTPATIENT)
Dept: ONCOLOGY | Facility: CLINIC | Age: 37
End: 2017-06-16
Attending: GENETIC COUNSELOR, MS
Payer: COMMERCIAL

## 2017-06-16 VITALS
DIASTOLIC BLOOD PRESSURE: 75 MMHG | HEART RATE: 58 BPM | TEMPERATURE: 97.3 F | WEIGHT: 110.7 LBS | SYSTOLIC BLOOD PRESSURE: 113 MMHG | BODY MASS INDEX: 21.73 KG/M2 | OXYGEN SATURATION: 99 % | HEIGHT: 60 IN

## 2017-06-16 DIAGNOSIS — Z84.81 FAMILY HISTORY OF GENETIC DISEASE CARRIER: ICD-10-CM

## 2017-06-16 DIAGNOSIS — Z80.41 FAMILY HISTORY OF MALIGNANT NEOPLASM OF OVARY: Primary | ICD-10-CM

## 2017-06-16 LAB — LAB SCANNED RESULT: NORMAL

## 2017-06-16 PROCEDURE — 40000072 ZZH STATISTIC GENETIC COUNSELING, < 16 MIN: Mod: ZF | Performed by: GENETIC COUNSELOR, MS

## 2017-06-16 NOTE — NURSING NOTE
Oncology Rooming Note    June 16, 2017 3:27 PM   Krystyna Smith is a 37 year old female who presents for:    Chief Complaint   Patient presents with     Oncology Clinic Visit     Results of genetic counseling      Initial Vitals: /75  Pulse 58  Temp 97.3  F (36.3  C)  Ht 1.524 m (5')  Wt 50.2 kg (110 lb 11.2 oz)  SpO2 99%  BMI 21.62 kg/m2 Estimated body mass index is 21.62 kg/(m^2) as calculated from the following:    Height as of this encounter: 1.524 m (5').    Weight as of this encounter: 50.2 kg (110 lb 11.2 oz). Body surface area is 1.46 meters squared.  Data Unavailable Comment: Data Unavailable   No LMP recorded. Patient is not currently having periods (Reason: UNKNOWN).  Allergies reviewed: Yes  Medications reviewed: Yes    Medications: Medication refills not needed today.  Pharmacy name entered into EmergentDetection:    [facility] - [place], [place] - [address]  [id number] - [place], [place] - [address] AT [facility] [address]    Clinical concerns: None  Yes was notified.    10 minutes for nursing intake (face to face time)     Alda Tijerina CMA

## 2017-06-16 NOTE — PATIENT INSTRUCTIONS
Negative Genetic Test Result    Genetic Testing  You had a blood test that looked at the genetic information in one or more genes associated with increased cancer risk.  The testing looked for any harmful changes that would stop this particular gene from working like it should. If an individual does not have any harmful changes or variants of unknown significance found from their blood test, their genetic test result is reported as negative.       Results  The genetic test did not identify any pathogenic (harmful) changes in the genes that were tested. There are several possible explanations for a negative test result. Without knowing the gene mutation in your family, the cause of the cancer in you or your relatives is still unknown. Your genetic counselor can help interpret the result for you and your relatives. In this case, there are several reasons that may explain the negative test result:    There may be a gene mutation in the family that you did not inherit.     You may have a gene mutation in a different gene that was not included in the test, or has not yet been discovered.     The cancers in you or your family may be due to a combination of genetic factors and environment (multifactorial/familial).    The cancers in you or your family may be sporadic/random cancers.    There is very small chance that a mutation was not found by current testing methods.  As testing technology evolves over time, it may still be possible to identify a mutation in a gene that was not found on this test.    It is important to note which genes were included in your test. A list of these genes can be found on your test result.    Screening Recommendations  Due to this negative test result, cancer screening recommendations should be based on your personal and family history. This may include increased cancer screening for you and/or your family members. Your genetic counselor and health care provider can help make  appropriate recommendations.      Please call us if you have any questions or concerns.     Cancer Risk Management Program  [phone] ([phone])  ? Bere Luna, MS, [employer]  [phone]  ? Kelsie Hansen, MS, [employer]  [phone]  ? Mali Brown, MS, [employer]  [phone]  ? Qing Lowery, MS, [employer]  [phone]  ? Griffin Booth, MS, [employer]  [phone]

## 2017-06-16 NOTE — LETTER
"6/16/2017       RE: Krystyna Smith  [address]  [place] [address]     Dear Colleague,    Thank you for referring your patient, Krystyna Smith, to the [facility]. Please see a copy of my visit note below.    6/16/2017    Referring Provider: Mehreen Dempsey CNP    Presenting Information:  Krystyna Smith returned to the Cancer Risk Management Program at [facility] to discuss her genetic testing results. Her blood was drawn on 05/19/2017 and we ordered site-specific testing for known YONATAN mutation in the family from [employer]. This testing was done because of her mother's positive genetic test result for the YONATAN mutation c.2880delC (p.Mop162Hjwxu*10)    Genetic Testing Results: NEGATIVE   Krystyna's test results were negative.  The mutation that was identified in her mother was in the YONATAN gene.  Specifically this mutation is called c.2880delC (p.Bbz560Hijym*10). Krystyna does not have the mutation previously identified in her mother. This test only looked for this one mutation.    A copy of the test report can be found in the Laboratory tab, dated 05/19/2017, and named \"SEND OUTS MISC TEST\". The report is scanned in as a linked document.    Interpretation:  Based on this test result, Krystyna is not at an increased risk for YONATAN-associated cancers.  Even though she does not have the familial YONATAN mutation, she is still at general population lifetime risk for breast cancer.     Also, as YONATAN mutations are typically not associated with ovarian cancer risk, we discussed that Krystyna is still at a slightly increased risk for ovarian cancer based on her mother's ovarian cancer diagnosis. Women with first degree relative with ovarian cancer have about 5-9% lifetime risk of ovarian cancer compared to the general population risk of about 1.6% lifetime risk.     Screening:  We discussed the importance of having routine cancer screening based on personal and " family history:    Krystyna's lifetime risk of breast cancer is estimated to be 11.9% based on the EDWIGE Risk Evaluation v8. This model takes into account different factors including Krystyna's age, age at menarche, age at first birth, height, weight, and family history of breast and ovarian cancer to calculate Krystyna's lifetime risk of breast cancer. Krystyna's lifetime risk is comparable with the general population risk. General population risk for breast cancer is about 12%. The recommendation is for women to have a breast mammogram annually beginning at age 40 years.     Due to Krystyna's family history of ovarian cancer, Krystyna and other close female relatives of the family member who had ovarian cancer remain at slightly increased risk for ovarian cancer.  We discussed available ovarian cancer screening (pelvic exams, CA-125 blood tests, and transvaginal ultrasounds) as well as the significant limitations of this screening.  As such, this screening is not typically recommended.  That being said, women in this family should discuss this screening and the signs and symptoms of ovarian cancer with their primary OB/GYN provider, as they may have individualized recommendations.    Inheritance:  We reviewed the autosomal dominant inheritance of YONATAN gene mutations.  We discussed that Krystyna cannot pass on this familial mutation to her children based on this test result. Mutations in this gene do not skip generations.      Plan:  1.  I provided Krystyna with a copy of her genetic test results today as well as my contact information should other family members want to consider genetic testing.   2.  If there are any further questions or concerns, she should not hesitate to contact me at [phone].    Face to face time: 15 minutes.    Griffin Booth MS, [facility]  Certified Genetic Counselor  T: [phone]  F: [phone]

## 2017-06-16 NOTE — LETTER
"    Cancer Risk Management  Program Locations    [facility]  [facility]  [facility]  Mailing Address  Cancer Risk Management Program  [facility]  [address]  [place] [address]    New patient appointments  [phone]  June 19, 2017    Krystyna Smith  [address]  [place] [address]      Dear Krystyna,    It was a pleasure meeting with you at [facility] on 06/16/2017. Here is a copy of the progress note from your recent genetic counseling visit to the Cancer Risk Management Program.  If you have any additional questions, please feel free to call.      6/16/2017    Referring Provider: Mehreen Dempsey CNP    Presenting Information:  Krystyna Smith returned to the Cancer Risk Management Program at [facility] to discuss her genetic testing results. Her blood was drawn on 05/19/2017 and we ordered site-specific testing for known YONATAN mutation in the family from [facility] [facility] [employer]. This testing was done because of her mother's positive genetic test result for the YONATAN mutation c.2880delC (p.Vgh180Tztdo*10)    Genetic Testing Results: NEGATIVE   Krystyna's test results were negative.  The mutation that was identified in her mother was in the YONATAN gene.  Specifically this mutation is called c.2880delC (p.Ikn773Aixhb*10). Krystyna does not have the mutation previously identified in her mother. This test only looked for this one mutation.    A copy of the test report can be found in the Laboratory tab, dated 05/19/2017, and named \"SEND OUTS MISC TEST\". The report is scanned in as a linked document.    Interpretation:  Based on this test result, Krystyna is not at an increased risk for YONATAN-associated cancers.  Even though she does not have the familial YONATAN mutation, she is still at general " population lifetime risk for breast cancer.     Also, as YONATAN mutations are typically not associated with ovarian cancer risk, we discussed that Krystyna is still at a slightly increased risk for ovarian cancer based on her mother's ovarian cancer diagnosis. Women with first degree relative with ovarian cancer have about 5-9% lifetime risk of ovarian cancer compared to the general population risk of about 1.6% lifetime risk.     Screening:  We discussed the importance of having routine cancer screening based on personal and family history:    Luisas lifetime risk of breast cancer is estimated to be 11.9% based on the EDWIGE Risk Evaluation v8. This model takes into account different factors including Krystyna's age, age at menarche, age at first birth, height, weight, and family history of breast and ovarian cancer to calculate Krystyna's lifetime risk of breast cancer. Krystyna's lifetime risk is comparable with the general population risk. General population risk for breast cancer is about 12%. The recommendation is for women to have a breast mammogram annually beginning at age 40 years.     Due to Krystyna's family history of ovarian cancer, Krystyna and other close female relatives of the family member who had ovarian cancer remain at slightly increased risk for ovarian cancer.  We discussed available ovarian cancer screening (pelvic exams, CA-125 blood tests, and transvaginal ultrasounds) as well as the significant limitations of this screening.  As such, this screening is not typically recommended.  That being said, women in this family should discuss this screening and the signs and symptoms of ovarian cancer with their primary OB/GYN provider, as they may have individualized recommendations.    Inheritance:  We reviewed the autosomal dominant inheritance of YONATAN gene mutations.  We discussed that Krystyna cannot pass on this familial mutation to her children based on this test result. Mutations in this gene  do not skip generations.      Plan:  1.  I provided Krystyna with a copy of her genetic test results today as well as my contact information should other family members want to consider genetic testing.   2.  If there are any further questions or concerns, she should not hesitate to contact me at [phone].    Face to face time: 15 minutes.    Griffin Booth MS, [facility]  Certified Genetic Counselor  T: [phone]  F: [phone]

## 2017-06-16 NOTE — MR AVS SNAPSHOT
After Visit Summary   6/16/2017    Krystyna Smith    MRN: [id number]           Patient Information     Date Of Birth          1980        Visit Information        Provider Department      6/16/2017 3:30 PM Griffin Booth GC;  [facility]ULT formerly [facility]        Care Instructions            Negative Genetic Test Result    Genetic Testing  You had a blood test that looked at the genetic information in one or more genes associated with increased cancer risk.  The testing looked for any harmful changes that would stop this particular gene from working like it should. If an individual does not have any harmful changes or variants of unknown significance found from their blood test, their genetic test result is reported as negative.       Results  The genetic test did not identify any pathogenic (harmful) changes in the genes that were tested. There are several possible explanations for a negative test result. Without knowing the gene mutation in your family, the cause of the cancer in you or your relatives is still unknown. Your genetic counselor can help interpret the result for you and your relatives. In this case, there are several reasons that may explain the negative test result:    There may be a gene mutation in the family that you did not inherit.     You may have a gene mutation in a different gene that was not included in the test, or has not yet been discovered.     The cancers in you or your family may be due to a combination of genetic factors and environment (multifactorial/familial).    The cancers in you or your family may be sporadic/random cancers.    There is very small chance that a mutation was not found by current testing methods.  As testing technology evolves over time, it may still be possible to identify a mutation in a gene that was not found on this test.    It is important to note which genes were included in your test. A list of these genes can be  found on your test result.    Screening Recommendations  Due to this negative test result, cancer screening recommendations should be based on your personal and family history. This may include increased cancer screening for you and/or your family members. Your genetic counselor and health care provider can help make appropriate recommendations.      Please call us if you have any questions or concerns.     Cancer Risk Management Program  [phone] ([phone]  ? Bere Luna, MS, [facility]  [phone]  ? Kelsie Tyrone, MS, [facility]  [phone]  ? Mali Kevin, MS, [facility]  [phone]  ? Qing Montserratandrew, MS, [facility]  [phone]  ? Griffin Booth, MS, [facility]  [phone]          Follow-ups after your visit        Your next 10 appointments already scheduled     Jun 16, 2017  3:30 PM CDT   (Arrive by 3:15 PM)   RETURN WITH ROOM with Griffin Booth GC,  [address] [facility]SULT [facility] ([facility] [place])    [address] [place] [id number] [phone]              Who to contact     If you have questions or need follow up information about today's clinic visit or your schedule please contact [facility] directly at [phone].  Normal or non-critical lab and imaging results will be communicated to you by Mygisticshart, letter or phone within 4 business days after the clinic has received the results. If you do not hear from us within 7 days, please contact the clinic through Mygisticshart or phone. If you have a critical or abnormal lab result, we will notify you by phone as soon as possible.  Submit refill requests through Re Pet or call your pharmacy and they will forward the refill request to us. Please allow 3 business days for your refill to be completed.          Additional Information About Your Visit        Re Pet Information     Re Pet gives you secure access to your electronic health record. If you see a primary care provider, you can also send  messages to your care team and make appointments. If you have questions, please call your primary care clinic.  If you do not have a primary care provider, please call [phone] and they will assist you.        Care EveryWhere ID     This is your Care EveryWhere ID. This could be used by other organizations to access your [facility] medical records  [id number]        Your Vitals Were     Pulse Temperature Height Pulse Oximetry BMI (Body Mass Index)       58 97.3  F (36.3  C) 1.524 m (5') 99% 21.62 kg/m2        Blood Pressure from Last 3 Encounters:   06/16/17 113/75   09/09/16 115/79   02/22/16 104/68    Weight from Last 3 Encounters:   06/16/17 50.2 kg (110 lb 11.2 oz)   09/09/16 49.3 kg (108 lb 9.6 oz)   02/22/16 47.5 kg (104 lb 12.8 oz)              Today, you had the following     No orders found for display       Primary Care Provider Office Phone # Fax #    CORINNE Luque [facility] [phone]       [facility] [address] [place] [address]        Thank you!     Thank you for choosing [facility]  for your care. Our goal is always to provide you with excellent care. Hearing back from our patients is one way we can continue to improve our services. Please take a few minutes to complete the written survey that you may receive in the mail after your visit with us. Thank you!             Your Updated Medication List - Protect others around you: Learn how to safely use, store and throw away your medicines at www.disposemymeds.org.          This list is accurate as of: 6/16/17  3:28 PM.  Always use your most recent med list.                   Brand Name Dispense Instructions for use    MULTIVITAMIN PO          NO ACTIVE MEDICATIONS      .       UNABLE TO FIND      MEDICATION NAME: a drink with many vitamins

## 2017-06-16 NOTE — Clinical Note
Please print and send a copy of this letter and the patient's genetic testing report to the patient.    Please enclose test report: Send outs misc test [[id number]] (Order [id number])

## 2017-06-16 NOTE — PROGRESS NOTES
"6/16/2017    Referring Provider: Mehreen Dempsey CNP    Presenting Information:  Krystyna Smith returned to the Cancer Risk Management Program at [facility] to discuss her genetic testing results. Her blood was drawn on 05/19/2017 and we ordered site-specific testing for known YONATAN mutation in the family from [employer]. This testing was done because of her mother's positive genetic test result for the YONATAN mutation c.2880delC (p.Iqu536Milke*10)    Genetic Testing Results: NEGATIVE   Krystyna's test results were negative.  The mutation that was identified in her mother was in the YONATAN gene.  Specifically this mutation is called c.2880delC (p.Veh980Souor*10). Krystyna does not have the mutation previously identified in her mother. This test only looked for this one mutation.    A copy of the test report can be found in the Laboratory tab, dated 05/19/2017, and named \"SEND OUTS MISC TEST\". The report is scanned in as a linked document.    Interpretation:  Based on this test result, Krystyna is not at an increased risk for YONATAN-associated cancers.  Even though she does not have the familial YONATAN mutation, she is still at general population lifetime risk for breast cancer.     Also, as YONATAN mutations are typically not associated with ovarian cancer risk, we discussed that Krystyna is still at a slightly increased risk for ovarian cancer based on her mother's ovarian cancer diagnosis. Women with first degree relative with ovarian cancer have about 5-9% lifetime risk of ovarian cancer compared to the general population risk of about 1.6% lifetime risk.     Screening:  We discussed the importance of having routine cancer screening based on personal and family history:    Luisas lifetime risk of breast cancer is estimated to be 11.9% based on the EDWIGE Risk Evaluation v8. This model takes into account different factors including Krystyna's age, age at menarche, age at first birth, height, " weight, and family history of breast and ovarian cancer to calculate Krystyna's lifetime risk of breast cancer. Krystyna's lifetime risk is comparable with the general population risk. General population risk for breast cancer is about 12%. The recommendation is for women to have a breast mammogram annually beginning at age 40 years.     Due to Krystyna's family history of ovarian cancer, Krystyna and other close female relatives of the family member who had ovarian cancer remain at slightly increased risk for ovarian cancer.  We discussed available ovarian cancer screening (pelvic exams, CA-125 blood tests, and transvaginal ultrasounds) as well as the significant limitations of this screening.  As such, this screening is not typically recommended.  That being said, women in this family should discuss this screening and the signs and symptoms of ovarian cancer with their primary OB/GYN provider, as they may have individualized recommendations.    Inheritance:  We reviewed the autosomal dominant inheritance of YONATAN gene mutations.  We discussed that Krystyna cannot pass on this familial mutation to her children based on this test result. Mutations in this gene do not skip generations.      Plan:  1.  I provided Krystyna with a copy of her genetic test results today as well as my contact information should other family members want to consider genetic testing.   2.  If there are any further questions or concerns, she should not hesitate to contact me at [phone].    Face to face time: 15 minutes.    Griffin Booth MS, [facility]  Certified Genetic Counselor  T: [phone]  F: [phone]

## 2017-06-27 ENCOUNTER — OFFICE VISIT (OUTPATIENT)
Dept: FAMILY MEDICINE | Facility: CLINIC | Age: 37
End: 2017-06-27
Payer: COMMERCIAL

## 2017-06-27 VITALS
OXYGEN SATURATION: 97 % | BODY MASS INDEX: 21.68 KG/M2 | HEIGHT: 60 IN | DIASTOLIC BLOOD PRESSURE: 65 MMHG | SYSTOLIC BLOOD PRESSURE: 99 MMHG | HEART RATE: 65 BPM | WEIGHT: 110.4 LBS | TEMPERATURE: 96 F

## 2017-06-27 DIAGNOSIS — N64.59 BREAST COMPLAINT: ICD-10-CM

## 2017-06-27 DIAGNOSIS — Z01.818 PREOP GENERAL PHYSICAL EXAM: Primary | ICD-10-CM

## 2017-06-27 PROCEDURE — 99214 OFFICE O/P EST MOD 30 MIN: CPT | Performed by: NURSE PRACTITIONER

## 2017-06-27 NOTE — MR AVS SNAPSHOT
After Visit Summary   6/27/2017    Krystyna Smith    MRN: [id number]           Patient Information     Date Of Birth          1980        Visit Information        Provider Department      6/27/2017 8:40 AM Kelsie Martinez APRN CNP [facility]        Today's Diagnoses     Preop general physical exam    -  1    Breast complaint           Follow-ups after your visit        Future tests that were ordered for you today     Open Future Orders        Priority Expected Expires Ordered    Hemoglobin Routine  6/27/2018 6/27/2017            Who to contact     If you have questions or need follow up information about today's clinic visit or your schedule please contact [facility] directly at [phone].  Normal or non-critical lab and imaging results will be communicated to you by [email or web address], letter or phone within 4 business days after the clinic has received the results. If you do not hear from us within 7 days, please contact the clinic through [email or web address] or phone. If you have a critical or abnormal lab result, we will notify you by phone as soon as possible.  Submit refill requests through [employer] or call your pharmacy and they will forward the refill request to us. Please allow 3 business days for your refill to be completed.          Additional Information About Your Visit        MyChart Information     [employer] gives you secure access to your electronic health record. If you see a primary care provider, you can also send messages to your care team and make appointments. If you have questions, please call your primary care clinic.  If you do not have a primary care provider, please call [phone] and they will assist you.        Care EveryWhere ID     This is your Care EveryWhere ID. This could be used by other organizations to access your [facility] medical records  [id number]        Your Vitals Were     Pulse Temperature Height Pulse Oximetry BMI (Body Mass  Index)       65 96  F (35.6  C) (Tympanic) 5' (1.524 m) 97% 21.56 kg/m2        Blood Pressure from Last 3 Encounters:   06/27/17 99/65   06/16/17 113/75   09/09/16 115/79    Weight from Last 3 Encounters:   06/27/17 110 lb 6.4 oz (50.1 kg)   06/16/17 110 lb 11.2 oz (50.2 kg)   09/09/16 108 lb 9.6 oz (49.3 kg)               Primary Care Provider Office Phone # Fax #    CORINNE Luque FILI [phone]       [facility] [address] [place] [address]        Equal Access to Services     LOLA CADET : Hadii giorgi adhikario Soomaali, waaxda luqadaha, qaybta kaalmada adeegyada, abilio santizoin angelina craven . So [facility] [phone].    ATENCIÓN: Si habla español, tiene a qureshi disposición servicios gratuitos de asistencia lingüística. Llame al [phone].    We comply with applicable federal civil rights laws and [place] laws. We do not discriminate on the basis of race, color, national origin, age, disability sex, sexual orientation or gender identity.            Thank you!     Thank you for choosing [facility]  for your care. Our goal is always to provide you with excellent care. Hearing back from our patients is one way we can continue to improve our services. Please take a few minutes to complete the written survey that you may receive in the mail after your visit with us. Thank you!             Your Updated Medication List - Protect others around you: Learn how to safely use, store and throw away your medicines at www.disposemymeds.org.          This list is accurate as of: 6/27/17  9:25 AM.  Always use your most recent med list.                   Brand Name Dispense Instructions for use Diagnosis    MULTIVITAMIN PO           NO ACTIVE MEDICATIONS      .        UNABLE TO FIND      MEDICATION NAME: a drink with many vitamins

## 2017-06-27 NOTE — NURSING NOTE
Chief Complaint   Patient presents with     Pre-Op Exam       Initial BP 99/65 (BP Location: Left arm, Patient Position: Chair, Cuff Size: Adult Regular)  Pulse 65  Temp 96  F (35.6  C) (Tympanic)  Ht 5' (1.524 m)  Wt 110 lb 6.4 oz (50.1 kg)  SpO2 97%  BMI 21.56 kg/m2 Estimated body mass index is 21.56 kg/(m^2) as calculated from the following:    Height as of this encounter: 5' (1.524 m).    Weight as of this encounter: 110 lb 6.4 oz (50.1 kg).  Medication Reconciliation: complete     Honey Salinas, CMA

## 2017-06-27 NOTE — PROGRESS NOTES
[address] [address]  [phone]  Dept: [phone]    PRE-OP EVALUATION:  Today's date: 2017    Krystyna Smith (: 1980) presents for pre-operative evaluation assessment as requested by Dr. Rod Spann.  She requires evaluation and anesthesia risk assessment prior to undergoing surgery/procedure for treatment of breast augmentation.    Date of Surgery/ Procedure:   Time of Surgery/ Procedure: 10:15 am  Hospital/Surgical Facility: [facility]  Fax number for surgical facility: [phone]  Primary Physician: Mehreen Dempsey  Type of Anesthesia Anticipated: General    Patient has a Health Care Directive or Living Will:  NO    Preop Questions 2017   1.  Do you have a history of heart attack, stroke, stent, bypass or surgery on an artery in the head, neck, heart or legs? No   2.  Do you ever have any pain or discomfort in your chest? No   3.  Do you have a history of  Heart Failure? No   4.   Are you troubled by shortness of breath when:  walking on a level surface, or up a slight hill, or at night? No   5.  Do you currently have a cold, bronchitis or other respiratory infection? No   6.  Do you have a cough, shortness of breath, or wheezing? No   7.  Do you sometimes get pains in the calves of your legs when you walk? No   8. Do you or anyone in your family have previous history of blood clots? No   9.  Do you or does anyone in your family have a serious bleeding problem such as prolonged bleeding following surgeries or cuts? No   10. Have you ever had problems with anemia or been told to take iron pills? No   11. Have you had any abnormal blood loss such as black, tarry or bloody stools, or abnormal vaginal bleeding? No   12. Have you ever had a blood transfusion? No   13. Have you or any of your relatives ever had problems with anesthesia? No   14. Do you have sleep apnea, excessive snoring or daytime  drowsiness? No   15. Do you have any prosthetic heart valves? No   16. Do you have prosthetic joints? No   17. Is there any chance that you may be pregnant? No         HPI:                                                      Brief HPI related to upcoming procedure: desires breast augmentation      See problem list for active medical problems.  Problems all longstanding and stable, except as noted/documented.  See ROS for pertinent symptoms related to these conditions.                                                                                                  .    MEDICAL HISTORY:                                                      Patient Active Problem List    Diagnosis Date Noted     CARDIOVASCULAR SCREENING; LDL GOAL LESS THAN 160 10/31/2010     Priority: Medium     Headache 2010     Priority: Medium     Problem list name updated by automated process. Provider to review       Amenorrhea, secondary 2010     Priority: Medium      Past Medical History:   Diagnosis Date     NO ACTIVE PROBLEMS      Past Surgical History:   Procedure Laterality Date     C/SECTION, LOW TRANSVERSE      , Low Transverse     Current Outpatient Prescriptions   Medication Sig Dispense Refill     Multiple Vitamins-Minerals (MULTIVITAMIN PO)        UNABLE TO FIND MEDICATION NAME: a drink with many vitamins       NO ACTIVE MEDICATIONS .       OTC products: None, except as noted above, no recent use of OTC ASA, NSAIDS or Steroids and no use of herbal medications or other supplements    Allergies   Allergen Reactions     Morphine Hives      Latex Allergy: NO    Social History   Substance Use Topics     Smoking status: Former Smoker     Quit date: 2005     Smokeless tobacco: Never Used     Alcohol use Yes      Comment: occ     History   Drug Use No       REVIEW OF SYSTEMS:                                                    Constitutional, neuro, ENT, endocrine, pulmonary, cardiac, gastrointestinal, genitourinary,  musculoskeletal, integument and psychiatric systems are negative, except as otherwise noted.    EXAM:                                                    BP 99/65 (BP Location: Left arm, Patient Position: Chair, Cuff Size: Adult Regular)  Pulse 65  Temp 96  F (35.6  C) (Tympanic)  Ht 5' (1.524 m)  Wt 110 lb 6.4 oz (50.1 kg)  SpO2 97%  BMI 21.56 kg/m2    GENERAL APPEARANCE: healthy, alert and no distress     EYES: EOMI, PERRL     HENT: ear canals and TM's normal and nose and mouth without ulcers or lesions     NECK: no adenopathy, no asymmetry, masses, or scars and thyroid normal to palpation     RESP: lungs clear to auscultation - no rales, rhonchi or wheezes     CV: regular rates and rhythm, normal S1 S2, no S3 or S4 and no murmur, click or rub     ABDOMEN:  soft, nontender, no HSM or masses and bowel sounds normal     MS: extremities normal- no gross deformities noted, no evidence of inflammation in joints, FROM in all extremities.     SKIN: no suspicious lesions or rashes     NEURO: Normal strength and tone, sensory exam grossly normal, mentation intact and speech normal     PSYCH: mentation appears normal. and affect normal/bright     LYMPHATICS: No axillary, cervical, or supraclavicular nodes    DIAGNOSTICS:                                                    No labs or EKG required for low risk surgery (cataract, skin procedure, breast biopsy, etc)    Recent Labs   Lab Test  09/09/16   1417  02/22/16   0952   HGB  13.6  13.2   PLT  224  237   A1C  4.9   --         IMPRESSION:                                                    Reason for surgery/procedure: desires breast augmentation  Diagnosis/reason for consult: preop exam     The proposed surgical procedure is considered LOW risk.    REVISED CARDIAC RISK INDEX  The patient has the following serious cardiovascular risks for perioperative complications such as (MI, PE, VFib and 3  AV Block):  No serious cardiac risks  INTERPRETATION: 0 risks: Class I (very  low risk - 0.4% complication rate)    The patient has the following additional risks for perioperative complications:  No identified additional risks      ICD-10-CM    1. Preop general physical exam Z01.818 Hemoglobin   2. Breast complaint N64.9 Hemoglobin       RECOMMENDATIONS:                                                      --Patient is to take all scheduled medications on the day of surgery EXCEPT for modifications listed below.    APPROVAL GIVEN to proceed with proposed procedure, without further diagnostic evaluation       Signed Electronically by: CORINNE Ridley CNP    Copy of this evaluation report is provided to requesting physician.    [facility] Preop Guidelines

## 2017-07-17 DIAGNOSIS — N64.59 BREAST COMPLAINT: ICD-10-CM

## 2017-07-17 DIAGNOSIS — Z01.818 PREOP GENERAL PHYSICAL EXAM: ICD-10-CM

## 2017-07-17 LAB — HGB BLD-MCNC: 13 G/DL (ref 11.7–15.7)

## 2017-07-17 PROCEDURE — 36415 COLL VENOUS BLD VENIPUNCTURE: CPT | Performed by: NURSE PRACTITIONER

## 2017-07-17 PROCEDURE — 85018 HEMOGLOBIN: CPT | Performed by: NURSE PRACTITIONER

## 2017-08-30 DIAGNOSIS — Z13.1 SCREENING FOR DIABETES MELLITUS: ICD-10-CM

## 2017-08-30 DIAGNOSIS — Z13.220 SCREENING FOR LIPOID DISORDERS: ICD-10-CM

## 2017-08-30 LAB
CHOLEST SERPL-MCNC: 191 MG/DL
GLUCOSE SERPL-MCNC: 85 MG/DL (ref 70–99)
HBA1C MFR BLD: 5 % (ref 4.3–6)
HDLC SERPL-MCNC: 128 MG/DL
LDLC SERPL CALC-MCNC: 53 MG/DL
NONHDLC SERPL-MCNC: 63 MG/DL
TRIGL SERPL-MCNC: 50 MG/DL

## 2017-08-30 PROCEDURE — 83036 HEMOGLOBIN GLYCOSYLATED A1C: CPT | Performed by: OBSTETRICS & GYNECOLOGY

## 2017-08-30 PROCEDURE — 82947 ASSAY GLUCOSE BLOOD QUANT: CPT | Performed by: OBSTETRICS & GYNECOLOGY

## 2017-08-30 PROCEDURE — 36415 COLL VENOUS BLD VENIPUNCTURE: CPT | Performed by: OBSTETRICS & GYNECOLOGY

## 2017-08-30 PROCEDURE — 80061 LIPID PANEL: CPT | Performed by: OBSTETRICS & GYNECOLOGY

## 2017-08-31 ENCOUNTER — TELEPHONE (OUTPATIENT)
Dept: OBGYN | Facility: CLINIC | Age: 37
End: 2017-08-31

## 2017-08-31 ENCOUNTER — ALLIED HEALTH/NURSE VISIT (OUTPATIENT)
Dept: NURSING | Facility: CLINIC | Age: 37
End: 2017-08-31
Payer: COMMERCIAL

## 2017-08-31 VITALS
DIASTOLIC BLOOD PRESSURE: 66 MMHG | HEIGHT: 60 IN | SYSTOLIC BLOOD PRESSURE: 103 MMHG | WEIGHT: 106.2 LBS | BODY MASS INDEX: 20.85 KG/M2

## 2017-08-31 DIAGNOSIS — Z01.30 BP CHECK: Primary | ICD-10-CM

## 2017-08-31 PROCEDURE — 99207 ZZC NO CHARGE NURSE ONLY: CPT

## 2017-08-31 NOTE — NURSING NOTE
Chief Complaint   Patient presents with     Forms     biometric       Initial /66  Ht 5' (1.524 m)  Wt 106 lb 3.2 oz (48.2 kg)  BMI 20.74 kg/m2 Estimated body mass index is 20.74 kg/(m^2) as calculated from the following:    Height as of this encounter: 5' (1.524 m).    Weight as of this encounter: 106 lb 3.2 oz (48.2 kg).  Medication Reconciliation: unable or not appropriate to perform     Biometric form filled out and placed into abstraction.  Rm Carty CMA

## 2017-08-31 NOTE — MR AVS SNAPSHOT
After Visit Summary   8/31/2017    Krystyna Smith    MRN: [id number]           Patient Information     Date Of Birth          1980        Visit Information        Provider Department      8/31/2017 10:00 AM [facility] MEDICAL ASSISTANT [facility]        Today's Diagnoses     BP check    -  1       Follow-ups after your visit        Your next 10 appointments already scheduled     Aug 31, 2017 10:00 AM CDT   Nurse Only with HP MEDICAL ASSISTANT   [facility] ([facility])    [address] [place] [id number] [phone]            Oct 05, 2017 11:00 AM CDT   PHYSICAL with Trina Gaming MD   [facility]    [address] [place] [address] [phone]              Who to contact     If you have questions or need follow up information about today's clinic visit or your schedule please contact [facility] directly at [phone].  Normal or non-critical lab and imaging results will be communicated to you by GeoSentrichart, letter or phone within 4 business days after the clinic has received the results. If you do not hear from us within 7 days, please contact the clinic through Backplanet or phone. If you have a critical or abnormal lab result, we will notify you by phone as soon as possible.  Submit refill requests through Battlepro or call your pharmacy and they will forward the refill request to us. Please allow 3 business days for your refill to be completed.          Additional Information About Your Visit        GeoSentrichart Information     Battlepro gives you secure access to your electronic health record. If you see a primary care provider, you can also send messages to your care team and make appointments. If you have questions, please call your primary care clinic.  If you do not have a primary care provider, please call [phone] and they will  assist you.        Care EveryWhere ID     This is your Care EveryWhere ID. This could be used by other organizations to access your [place] medical records  [id number]        Your Vitals Were     Height BMI (Body Mass Index)                5' (1.524 m) 20.74 kg/m2           Blood Pressure from Last 3 Encounters:   08/31/17 103/66   06/27/17 99/65   06/16/17 113/75    Weight from Last 3 Encounters:   08/31/17 106 lb 3.2 oz (48.2 kg)   06/27/17 110 lb 6.4 oz (50.1 kg)   06/16/17 110 lb 11.2 oz (50.2 kg)              Today, you had the following     No orders found for display       Primary Care Provider Office Phone # Fax #    Mehreen CORINNE Orozco [phone] [address] [place] [address]        Equal Access to Services     VIKAS CADET : Hadii aad ku hadasho Soomaali, waaxda luqadaha, qaybta kaalmada adeegyada, waxay sukhdevin hayjanuaryn ivet craven . So [facility] [phone].    ATENCIÓN: Si habla español, tiene a qureshi disposición servicios gratuitos de asistencia lingüística. Llame al [phone].    We comply with applicable federal civil rights laws and [place] laws. We do not discriminate on the basis of race, color, national origin, age, disability sex, sexual orientation or gender identity.            Thank you!     Thank you for choosing [facility]  for your care. Our goal is always to provide you with excellent care. Hearing back from our patients is one way we can continue to improve our services. Please take a few minutes to complete the written survey that you may receive in the mail after your visit with us. Thank you!             Your Updated Medication List - Protect others around you: Learn how to safely use, store and throw away your medicines at www.disposemymeds.org.          This list is accurate as of: 8/31/17  9:29 AM.  Always use your most recent med list.                   Brand Name Dispense Instructions for use Diagnosis     MULTIVITAMIN PO           NO ACTIVE MEDICATIONS      .        UNABLE TO FIND      MEDICATION NAME: a drink with many vitamins

## 2017-08-31 NOTE — TELEPHONE ENCOUNTER
Patient needs biometric form filled out for insurance. All labs and have been resulted, bp, weight and height have been recorded. Form has been filled out, signed and faxed in. Placed in abstraction.  Rm Carty CMA

## 2017-10-05 ENCOUNTER — OFFICE VISIT (OUTPATIENT)
Dept: OBGYN | Facility: CLINIC | Age: 37
End: 2017-10-05
Payer: COMMERCIAL

## 2017-10-05 VITALS
HEIGHT: 60 IN | DIASTOLIC BLOOD PRESSURE: 69 MMHG | WEIGHT: 109 LBS | HEART RATE: 62 BPM | TEMPERATURE: 96.5 F | BODY MASS INDEX: 21.4 KG/M2 | SYSTOLIC BLOOD PRESSURE: 102 MMHG

## 2017-10-05 DIAGNOSIS — Z01.411 ENCOUNTER FOR GYNECOLOGICAL EXAMINATION WITH ABNORMAL FINDING: Primary | ICD-10-CM

## 2017-10-05 DIAGNOSIS — N91.1 AMENORRHEA, SECONDARY: ICD-10-CM

## 2017-10-05 DIAGNOSIS — Z23 NEED FOR PROPHYLACTIC VACCINATION AND INOCULATION AGAINST INFLUENZA: ICD-10-CM

## 2017-10-05 PROCEDURE — 90686 IIV4 VACC NO PRSV 0.5 ML IM: CPT | Performed by: OBSTETRICS & GYNECOLOGY

## 2017-10-05 PROCEDURE — 90471 IMMUNIZATION ADMIN: CPT | Performed by: OBSTETRICS & GYNECOLOGY

## 2017-10-05 PROCEDURE — 99395 PREV VISIT EST AGE 18-39: CPT | Mod: 25 | Performed by: OBSTETRICS & GYNECOLOGY

## 2017-10-05 PROCEDURE — 99213 OFFICE O/P EST LOW 20 MIN: CPT | Mod: 25 | Performed by: OBSTETRICS & GYNECOLOGY

## 2017-10-05 RX ORDER — MEDROXYPROGESTERONE ACETATE 10 MG
10 TABLET ORAL DAILY
Qty: 10 TABLET | Refills: 0 | Status: SHIPPED | OUTPATIENT
Start: 2017-10-05 | End: 2017-10-15

## 2017-10-05 NOTE — NURSING NOTE
Chief Complaint   Patient presents with     Physical       Initial /69  Pulse 62  Temp 96.5  F (35.8  C) (Oral)  Ht 5' (1.524 m)  Wt 109 lb (49.4 kg)  BMI 21.29 kg/m2 Estimated body mass index is 21.29 kg/(m^2) as calculated from the following:    Height as of this encounter: 5' (1.524 m).    Weight as of this encounter: 109 lb (49.4 kg).  BP completed using cuff size: regular        The following HM Due: NONE      The following patient reported/Care Every where data was sent to:  P ABSTRACT QUALITY INITIATIVES [66042]  NONE     patient has appointment for today

## 2017-10-05 NOTE — MR AVS SNAPSHOT
After Visit Summary   10/5/2017    Krystyna Smith    MRN: [id number]           Patient Information     Date Of Birth          1980        Visit Information        Provider Department      10/5/2017 11:00 AM Trina Gaming MD [facility]        Today's Diagnoses     Encounter for gynecological examination with abnormal finding    -  1    Need for prophylactic vaccination and inoculation against influenza        Amenorrhea, secondary          Care Instructions    Provera for 10 days.  Let me know if no menses by 14 days after you took last tablet and then would get MRI of brain to rule out pituitary issue    If that's normal, can start OCP if desire or not    If you decide you want to revise the scar tissue let me know and we can get that scheduled.            Follow-ups after your visit        Who to contact     If you have questions or need follow up information about today's clinic visit or your schedule please contact [facility] directly at [phone].  Normal or non-critical lab and imaging results will be communicated to you by HistoRxhart, letter or phone within 4 business days after the clinic has received the results. If you do not hear from us within 7 days, please contact the clinic through HistoRxhart or phone. If you have a critical or abnormal lab result, we will notify you by phone as soon as possible.  Submit refill requests through Synapsify or call your pharmacy and they will forward the refill request to us. Please allow 3 business days for your refill to be completed.          Additional Information About Your Visit        HistoRxhart Information     Synapsify gives you secure access to your electronic health record. If you see a primary care provider, you can also send messages to your care team and make appointments. If you have questions, please call your primary care clinic.  If you do not have a primary care provider, please call [phone] and  they will assist you.        Care EveryWhere ID     This is your Care EveryWhere ID. This could be used by other organizations to access your [place] medical records  [id number]        Your Vitals Were     Pulse Temperature Height BMI (Body Mass Index)          62 96.5  F (35.8  C) (Oral) 5' (1.524 m) 21.29 kg/m2         Blood Pressure from Last 3 Encounters:   10/05/17 102/69   08/31/17 103/66   06/27/17 99/65    Weight from Last 3 Encounters:   10/05/17 109 lb (49.4 kg)   08/31/17 106 lb 3.2 oz (48.2 kg)   06/27/17 110 lb 6.4 oz (50.1 kg)              We Performed the Following     FLU VAC, SPLIT VIRUS IM > 3 YO (QUADRIVALENT) [73980]     Vaccine Administration, Initial [69263]          Today's Medication Changes          These changes are accurate as of: 10/5/17 11:43 AM.  If you have any questions, ask your nurse or doctor.               Start taking these medicines.        Dose/Directions    medroxyPROGESTERone 10 MG tablet   Commonly known as:  PROVERA   Used for:  Amenorrhea, secondary   Started by:  Trina Gaming MD        Dose:  10 mg   Take 1 tablet (10 mg) by mouth daily for 10 days   Quantity:  10 tablet   Refills:  0            Where to get your medicines      These medications were sent to [facility] - [place], [place] - [address]  [address], [place] [address]     Phone:  [phone]     medroxyPROGESTERone 10 MG tablet                Primary Care Provider Office Phone # Fax #    Mehreen Dempsey, APRN [phone]       [address] [place] [address]        Equal Access to Services     LOLA CADET AH: Christiana Yarbrough, wajesúsda luqadaha, qaybta kaalmada adeegyada, abilio sanders. Nelli [facility] [phone].    ATENCIÓN: Si habla español, tiene a qureshi disposición servicios gratuitos de asistencia lingüística. Lllaura al [phone].    We comply with applicable federal civil rights laws and  [place] laws. We do not discriminate on the basis of race, color, national origin, age, disability, sex, sexual orientation, or gender identity.            Thank you!     Thank you for choosing [facility]  for your care. Our goal is always to provide you with excellent care. Hearing back from our patients is one way we can continue to improve our services. Please take a few minutes to complete the written survey that you may receive in the mail after your visit with us. Thank you!             Your Updated Medication List - Protect others around you: Learn how to safely use, store and throw away your medicines at www.disposemymeds.org.          This list is accurate as of: 10/5/17 11:43 AM.  Always use your most recent med list.                   Brand Name Dispense Instructions for use Diagnosis    medroxyPROGESTERone 10 MG tablet    PROVERA    10 tablet    Take 1 tablet (10 mg) by mouth daily for 10 days    Amenorrhea, secondary       MULTIVITAMIN PO           NO ACTIVE MEDICATIONS      .        UNABLE TO FIND      MEDICATION NAME: a drink with many vitamins

## 2017-10-05 NOTE — PATIENT INSTRUCTIONS
Provera for 10 days.  Let me know if no menses by 14 days after you took last tablet and then would get MRI of brain to rule out pituitary issue    If that's normal, can start OCP if desire or not    If you decide you want to revise the scar tissue let me know and we can get that scheduled.

## 2017-10-05 NOTE — PROGRESS NOTES
Krystyna is a 37 year old  female who presents for annual exam.     Menses are none.  No LMP recorded. Patient is not currently having periods (Reason: UNKNOWN).. Using none for contraception.  She is not currently considering pregnancy.  Besides routine health maintenance,  she would like to discuss f/u no periods--really no menses since  after mirena IUD removed, had one menses.  Was seen last year in September and all labs done, but did not get MRI scheduled after had no provera withdrawal.  Got busy with work but now is ready to get stuff done.  Had labs done at work recently and normal, cholesterol, etc.  Now getting masters degree in holistic health.   Son is 14 y/o now and in 7th grade.   Still with severe dyspareunia that she has had ever since childbirth.  Clinic notes from last year reviewed today and had a band of scar tissue noted on posterior vaginal wall.  Did get genetic testing done since aunt had breast cancer and mom with ovarian, found to be negative for genetics.   GYNECOLOGIC HISTORY:  Menarche:   Krystyna is sexually active with 1male partner(s) and is currently in monogamous relationship.    History sexually transmitted infections:No STD history  STI testing offered?  Declined  RAMAN exposure: Unknown  History of abnormal Pap smear: NO - age 30- 65 PAP every 3 years recommended  Family history of breast CA: aunt  Family history of uterine/ovarian CA: Yes (Please explain): mom    Family history of colon CA: yes, MGF  HEALTH MAINTENANCE:  Cholesterol: (  Cholesterol   Date Value Ref Range Status   2017 191 <200 mg/dL Final   2016 193 <200 mg/dL Final    History of abnormal lipids: No  Mammo: na . History of abnormal Mammo: Not applicable.  Regular Self Breast Exams: Yes  Calcium/Vitamin D intake: source:  dairy, dietary supplement(s) Adequate? No  TSH: (  TSH   Date Value Ref Range Status   2016 2.14 0.40 - 4.00 mU/L Final    )  Pap; (  Lab Results   Component Value  Date    PAP NIL 2015    PAP NIL 10/08/2012    PAP NIL 2010    )    HISTORY:  Obstetric History       T1      L1     SAB0   TAB0   Ectopic0   Multiple0   Live Births1       # Outcome Date GA Lbr Chicho/2nd Weight Sex Delivery Anes PTL Lv   1 Term 04   7 lb 3 oz (3.26 kg) M -SEC   HECTOR        Past Medical History:   Diagnosis Date     NO ACTIVE PROBLEMS      Past Surgical History:   Procedure Laterality Date     C/SECTION, LOW TRANSVERSE      , Low Transverse     Family History   Problem Relation Age of Onset     Allergies Mother      Ovarian Cancer Mother 62     YONATAN genetic carrier     Cancer - colorectal Maternal Grandfather      CEREBROVASCULAR DISEASE Paternal Grandmother      HEART DISEASE Paternal Grandfather      older     Social History     Social History     Marital status:      Spouse name: N/A     Number of children: 1     Years of education: N/A     Occupational History     [employer], health and [employer]      Social History Main Topics     Smoking status: Former Smoker     Quit date: 2005     Smokeless tobacco: Never Used     Alcohol use Yes      Comment: occ     Drug use: No     Sexual activity: Yes     Partners: Male     Birth control/ protection: None     Other Topics Concern     Not on file     Social History Narrative       Current Outpatient Prescriptions:      Multiple Vitamins-Minerals (MULTIVITAMIN PO), , Disp: , Rfl:      UNABLE TO FIND, MEDICATION NAME: a drink with many vitamins, Disp: , Rfl:      NO ACTIVE MEDICATIONS, ., Disp: , Rfl:      Allergies   Allergen Reactions     Morphine Hives       Past medical, surgical, social and family history were reviewed and updated in EPIC.    EXAM:  /69  Pulse 62  Temp 96.5  F (35.8  C) (Oral)  Ht 5' (1.524 m)  Wt 109 lb (49.4 kg)  BMI 21.29 kg/m2   BMI: Body mass index is 21.29 kg/(m^2).  Constitutional: healthy, alert and no distress  Head: Normocephalic. No  masses, lesions, tenderness or abnormalities  Neck: Neck supple. Trachea midline. No adenopathy. Thyroid symmetric, normal size.   Cardiovascular: RRR.   Respiratory: Negative.   Breast: No nodularity, asymmetry or nipple discharge bilaterally. S/p implants bilaterally.   Gastrointestinal: Abdomen soft, non-tender, non-distended. No masses, organomegaly.  :  Vulva:  No external lesions, normal female hair distribution, no inguinal adenopathy.    Urethra:  Midline, non-tender, well supported, no discharge  Vagina:  Moist, pink, no abnormal discharge, can feel a prominent band of scar tissue on posterior wall of vagina about 1 cm into introitus that makes 2 finger exam difficult. She states pain when I push on tissue band  Uterus:  Normal size , non-tender, freely mobile  Ovaries:  No masses appreciated, non-tender, mobile  Rectal Exam: deferred  Musculoskeletal: extremities normal  Skin: no suspicious lesions or rashes  Psychiatric: Affect appropriate, cooperative,mentation appears normal.     COUNSELING:   Reviewed preventive health counseling, as reflected in patient instructions   reports that she quit smoking about 12 years ago. She has never used smokeless tobacco.    Body mass index is 21.29 kg/(m^2).      FRAX Risk Assessment    ASSESSMENT:  37 year old female with satisfactory annual exam  (Z01.411) Encounter for gynecological examination with abnormal finding  (primary encounter diagnosis)  Comment: no birthcontrol  Plan:  Had normal labs last year.  Has dyspareunia with scar tissue band and so discussed what taking that out would entail.  Discussed under MAC with local, risk of bleeding, infection, scar issue could come back and still have dyspareunia afterwards. Discussed nothing per vagina for 4-6 week until stitches gone. Questions answered. She may want to schedule this.     (Z23) Need for prophylactic vaccination and inoculation against influenza  Plan: FLU VAC, SPLIT VIRUS IM > 3 YO (QUADRIVALENT)          [71697], Vaccine Administration, Initial         [50848]    (N91.1) Amenorrhea, secondary  Comment: since 2011  Plan: medroxyPROGESTERone (PROVERA) 10 MG tablet        Will do provera and see if has withdrawal bleed.  If not, plan MRI and she will do this.  Discussed if normal, needs to consider OCP for bone health.  Would also need to get chromosomes done since no menses since she was 30 y/o.     Trina Gaming MD        Injectable Influenza Immunization Documentation    1.  Is the person to be vaccinated sick today?   No    2. Does the person to be vaccinated have an allergy to a component   of the vaccine?   No    3. Has the person to be vaccinated ever had a serious reaction   to influenza vaccine in the past?   No    4. Has the person to be vaccinated ever had Guillain-Barré syndrome?   No    Form completed by pt

## 2018-01-22 RX ORDER — PHENAZOPYRIDINE HYDROCHLORIDE 200 MG/1
200 TABLET, FILM COATED ORAL ONCE
Status: CANCELLED | OUTPATIENT
Start: 2018-02-22

## 2018-02-15 RX ORDER — CEFAZOLIN SODIUM 2 G/100ML
2 INJECTION, SOLUTION INTRAVENOUS
Status: CANCELLED | OUTPATIENT
Start: 2018-03-14

## 2018-02-15 RX ORDER — CEFAZOLIN SODIUM 1 G/3ML
1 INJECTION, POWDER, FOR SOLUTION INTRAMUSCULAR; INTRAVENOUS SEE ADMIN INSTRUCTIONS
Status: CANCELLED | OUTPATIENT
Start: 2018-03-14

## 2018-02-15 RX ORDER — PHENAZOPYRIDINE HYDROCHLORIDE 200 MG/1
200 TABLET, FILM COATED ORAL ONCE
Status: CANCELLED | OUTPATIENT
Start: 2018-03-14

## 2018-02-25 ENCOUNTER — OFFICE VISIT (OUTPATIENT)
Dept: URGENT CARE | Facility: URGENT CARE | Age: 38
End: 2018-02-25
Payer: COMMERCIAL

## 2018-02-25 ENCOUNTER — RADIANT APPOINTMENT (OUTPATIENT)
Dept: GENERAL RADIOLOGY | Facility: CLINIC | Age: 38
End: 2018-02-25
Attending: PHYSICIAN ASSISTANT
Payer: COMMERCIAL

## 2018-02-25 VITALS
SYSTOLIC BLOOD PRESSURE: 110 MMHG | DIASTOLIC BLOOD PRESSURE: 75 MMHG | HEART RATE: 63 BPM | TEMPERATURE: 96.8 F | BODY MASS INDEX: 20.77 KG/M2 | HEIGHT: 61 IN | WEIGHT: 110 LBS | OXYGEN SATURATION: 100 %

## 2018-02-25 DIAGNOSIS — R14.0 ABDOMINAL BLOATING: Primary | ICD-10-CM

## 2018-02-25 LAB
BASOPHILS # BLD AUTO: 0 10E9/L (ref 0–0.2)
BASOPHILS NFR BLD AUTO: 0.4 %
DIFFERENTIAL METHOD BLD: ABNORMAL
EOSINOPHIL # BLD AUTO: 0.2 10E9/L (ref 0–0.7)
EOSINOPHIL NFR BLD AUTO: 3.3 %
ERYTHROCYTE [DISTWIDTH] IN BLOOD BY AUTOMATED COUNT: 12.2 % (ref 10–15)
HCT VFR BLD AUTO: 37.3 % (ref 35–47)
HGB BLD-MCNC: 12.7 G/DL (ref 11.7–15.7)
LYMPHOCYTES # BLD AUTO: 1.3 10E9/L (ref 0.8–5.3)
LYMPHOCYTES NFR BLD AUTO: 29.6 %
MCH RBC QN AUTO: 33.1 PG (ref 26.5–33)
MCHC RBC AUTO-ENTMCNC: 34 G/DL (ref 31.5–36.5)
MCV RBC AUTO: 97 FL (ref 78–100)
MONOCYTES # BLD AUTO: 0.7 10E9/L (ref 0–1.3)
MONOCYTES NFR BLD AUTO: 16 %
NEUTROPHILS # BLD AUTO: 2.3 10E9/L (ref 1.6–8.3)
NEUTROPHILS NFR BLD AUTO: 50.7 %
PLATELET # BLD AUTO: 210 10E9/L (ref 150–450)
RBC # BLD AUTO: 3.84 10E12/L (ref 3.8–5.2)
WBC # BLD AUTO: 4.5 10E9/L (ref 4–11)

## 2018-02-25 PROCEDURE — 36415 COLL VENOUS BLD VENIPUNCTURE: CPT | Performed by: PHYSICIAN ASSISTANT

## 2018-02-25 PROCEDURE — 99214 OFFICE O/P EST MOD 30 MIN: CPT | Performed by: PHYSICIAN ASSISTANT

## 2018-02-25 PROCEDURE — 85025 COMPLETE CBC W/AUTO DIFF WBC: CPT | Performed by: PHYSICIAN ASSISTANT

## 2018-02-25 PROCEDURE — 74019 RADEX ABDOMEN 2 VIEWS: CPT

## 2018-02-25 NOTE — PROGRESS NOTES
"SUBJECTIVE:   Krystyna Smith is a 37 year old female presenting for evaluation of   Chief Complaint   Patient presents with     Urgent Care     Pt in clinic to have eval for intermittent abdominal pain and bloating.  Pt also states she is experiencing some diarrhea and gas for 4 days following a visit to the [place]     Bloated     Abdominal Pain   .  She was in the [place] - returned last Tuesday night. [place] Symptoms started 1 day later. She has abdominal pain, bloating, diarrhea. Abdominal pain feels like cramping. It is generalized. Diarrhea she describes as \"loose stool\" and is brown in color. No vomiting. No bloody stools. She is not passing gas but feels super gassy. She is able to pass a little bit of gas \"here and there.\" No fevers. She did have the chills on day 1. She is tolerating PO liquid intake.  She took immodium yesterday without much relief. She tried Pepto Bismol today without much improvement.  She did have 1  13 years ago. She still has an appendix and gallbladder.  She says this happens to her frequently.    ROS  See HPI    PMH:  Past Medical History:   Diagnosis Date     NO ACTIVE PROBLEMS        Current medications:  Current Outpatient Prescriptions   Medication Sig Dispense Refill     Multiple Vitamins-Minerals (MULTIVITAMIN PO)        UNABLE TO FIND MEDICATION NAME: a drink with many vitamins       NO ACTIVE MEDICATIONS .         Family history:  Family History   Problem Relation Age of Onset     Allergies Mother      Ovarian Cancer Mother 62     YONATAN genetic carrier     Cancer - colorectal Maternal Grandfather      CEREBROVASCULAR DISEASE Paternal Grandmother      HEART DISEASE Paternal Grandfather      older     Breast Cancer Maternal Aunt 72         Social History:  Social History   Substance Use Topics     Smoking status: Former Smoker     Quit date: 2005     Smokeless tobacco: Never Used     Alcohol use Yes      Comment: " "occ       OBJECTIVE  /75  Pulse 63  Temp 96.8  F (36  C) (Tympanic)  Ht 5' 1\" (1.549 m)  Wt 110 lb (49.9 kg)  SpO2 100%  BMI 20.78 kg/m2    Physical Exam  General: alert, appears nontoxic, NAD. Afebrile.  Skin: no suspicious lesions or rashes.  HEENT: Normocephalic.   Eyes: conjunctiva clear. No scleral icterus.  Oropharynx: MMM.   Respiratory: No distress. Equal inspiration to bilateral bases. No crackles wheeze, rhonchi, rales.   Cardiovascular: RRR. No murmurs, clicks, gallups, or rub.   Gastrointestinal: abdomen significantly distended and tympanic to percussion. Very mild generalized tenderness to palpation but no focal tenderness. No rigidity or guarding or rebounding. BS + in all 4 quadrants- hyperactive throughout. No masses, organomegaly.        Labs:  Results for orders placed or performed in visit on 02/25/18 (from the past 24 hour(s))   XR Abdomen 2 Views    Narrative    ABDOMEN TWO VIEWS 2/25/2018 12:49 PM     HISTORY: ? obstruction- significant bloating; Abdominal bloating    COMPARISON: None      Impression    IMPRESSION: Prominent stool identified in the colon which is mildly  distended with gas. The distal descending and rectosigmoid colon are  not well visualized and there is mild soft tissue density filling the  pelvis. Cannot rule out distended bladder or pelvic mass or low colon  obstruction. This could also be just related to distended bladder and  constipation. Discussed with referring clinician.    LIBERTY ARAGON MD       X-Ray was done, my findings are: significant stool burden, mildly dilated colon. I do not see any bowel gas in the rectum.      ASSESSMENT:      ICD-10-CM    1. Abdominal bloating R14.0 CBC with platelets differential     XR Abdomen 2 Views        Medical Decision Making:    Differential Diagnosis: bowel obstruction, traveler's diarrhea, gastroenteritis, infectious colitis, toxic megacolon, constipation    CBC showed no leukocytosis (WBCs 4.8). There was a " slight left shift which could indicate developing bacterial infection. This could be a traveler's diarrhea. However, I would expect that if this were an acute intra-abdominal infection such as toxic megacolon or infectious colitis, she would have a leukocytosis.    No acute abdomen on exam- do not suspect appendicitis or acute biliary disease. Furthermore, symptoms have been going on for 4 days already, thus the time frame is not fitting for either of these diagnoses.    Less likely bowel obstruction as she is not vomiting, however she is quite distended so abdominal xrays were done. Per my read, I was concerned about a paucity of bowel gas in the rectum. I spoke with the radiologist about her xrays. Xrays show significant stool burden and mildly dilated colon. Radiologist also did not appreciate bowel gas all the way to the rectum. Patient could be obstructed, but she is not vomiting and is tolerating PO intake, thus I think this is less likely. She does have a history of nearly these same symptoms occurring a few times prior, and she says she has cured herself with a few days of a liquid diet. It is possible that she is intermittently obstructing on adhesions from prior surgery, and it self-corrects.  I discussed with her that we cannot confidently rule out obstruction today. I told her that the next step would be a CT scan, which would have to be done in the ER. She really did not want to go to the ER. I think it is reasonable that she trial bowel clean out with Miralax and a liquid diet for a few days, with STRICT return precautions if either her pain were to return or increase, or if she were to vomit. She agreed to this plan.      Serious Comorbid Conditions: none known      PLAN:  Recommend Miralax 1-2 times daily  Push fluids  Go to the ER if return of severe pain, or developing vomiting, fever, or any other new concerning symptoms.  Otherwise follow up with PCP if no improvement in 1-2 days.  Patient  understood and agreed to plan. Patient was appropriate for discharge.      Patient Instructions   Recommend Miralax 1-2 times daily  Push fluids  Go to the ER if return of severe pain, or developing vomiting, fever, or any other new concerning symptoms.  Otherwise follow up with PCP if no improvement in 1-2 days.            Evelia Trinidad PA-C  02/25/18 1:49 PM

## 2018-02-25 NOTE — MR AVS SNAPSHOT
After Visit Summary   2/25/2018    Krystyna Smith    MRN: [id number]           Patient Information     Date Of Birth          1980        Visit Information        Provider Department      2/25/2018 11:35 AM Evelia Trinidad PA-C [facility]        Today's Diagnoses     Abdominal bloating    -  1      Care Instructions    Recommend Miralax 1-2 times daily  Push fluids  Go to the ER if return of severe pain, or developing vomiting, fever, or any other new concerning symptoms.  Otherwise follow up with PCP if no improvement in 1-2 days.          Follow-ups after your visit        Follow-up notes from your care team     Return if symptoms worsen or fail to improve.      Your next 10 appointments already scheduled     Mar 14, 2018   Procedure with Trina Gaming MD   [facility], [place], Same Day Surgery (--)    [address] [facility] [address] [phone]              Who to contact     If you have questions or need follow up information about today's clinic visit or your schedule please contact [facility] directly at [phone].  Normal or non-critical lab and imaging results will be communicated to you by Adallomhart, letter or phone within 4 business days after the clinic has received the results. If you do not hear from us within 7 days, please contact the clinic through Adallomhart or phone. If you have a critical or abnormal lab result, we will notify you by phone as soon as possible.  Submit refill requests through Aircom or call your pharmacy and they will forward the refill request to us. Please allow 3 business days for your refill to be completed.          Additional Information About Your Visit        Adallomhart Information     Aircom gives you secure access to your electronic health record. If you see a primary care provider, you can also send messages to your care team and make appointments. If you have questions, please call  "your primary care clinic.  If you do not have a primary care provider, please call [phone] and they will assist you.        Care EveryWhere ID     This is your Care EveryWhere ID. This could be used by other organizations to access your [facility] medical records  [id number]        Your Vitals Were     Pulse Temperature Height Pulse Oximetry BMI (Body Mass Index)       63 96.8  F (36  C) (Tympanic) 5' 1\" (1.549 m) 100% 20.78 kg/m2        Blood Pressure from Last 3 Encounters:   02/25/18 110/75   10/05/17 102/69   08/31/17 103/66    Weight from Last 3 Encounters:   02/25/18 110 lb (49.9 kg)   10/05/17 109 lb (49.4 kg)   08/31/17 106 lb 3.2 oz (48.2 kg)              We Performed the Following     CBC with platelets differential     XR Abdomen 2 Views        Primary Care Provider Office Phone # Fax #    Mehreen Dempsey, APRN [facility] [phone] [address] [place] [address]        Equal Access to Services     VIKAS CADET : Hadii aad ku hadasho Soomaali, waaxda luqadaha, qaybta kaalmada adeegyada, abilio craven . So [facility] [phone].    ATENCIÓN: Si habla español, tiene a qureshi disposición servicios gratuitos de asistencia lingüística. LlThe [facility] [phone].    We comply with applicable federal civil rights laws and [place] laws. We do not discriminate on the basis of race, color, national origin, age, disability, sex, sexual orientation, or gender identity.            Thank you!     Thank you for choosing [facility]  for your care. Our goal is always to provide you with excellent care. Hearing back from our patients is one way we can continue to improve our services. Please take a few minutes to complete the written survey that you may receive in the mail after your visit with us. Thank you!             Your Updated Medication List - Protect others around you: Learn how to safely use, store and throw away your medicines at " www.disposemymeds.org.          This list is accurate as of 2/25/18  1:50 PM.  Always use your most recent med list.                   Brand Name Dispense Instructions for use Diagnosis    MULTIVITAMIN PO           NO ACTIVE MEDICATIONS      .        UNABLE TO FIND      MEDICATION NAME: a drink with many vitamins

## 2018-02-25 NOTE — NURSING NOTE
"Chief Complaint   Patient presents with     Urgent Care     Pt in clinic to have eval for intermittent abdominal pain and bloating.  Pt also states she is experiencing some diarrhea and gas for 4 days following a visit to the [place]     Bloated     Abdominal Pain       Initial /75  Pulse 63  Temp 96.8  F (36  C) (Tympanic)  Ht 5' 1\" (1.549 m)  Wt 110 lb (49.9 kg)  SpO2 100%  BMI 20.78 kg/m2 Estimated body mass index is 20.78 kg/(m^2) as calculated from the following:    Height as of this encounter: 5' 1\" (1.549 m).    Weight as of this encounter: 110 lb (49.9 kg).  Medication Reconciliation: complete   Esperanza Salcedo/ MA    "

## 2018-02-25 NOTE — PATIENT INSTRUCTIONS
Recommend Miralax 1-2 times daily  Push fluids  Go to the ER if return of severe pain, or developing vomiting, fever, or any other new concerning symptoms.  Otherwise follow up with PCP if no improvement in 1-2 days.

## 2018-03-05 ENCOUNTER — OFFICE VISIT (OUTPATIENT)
Dept: FAMILY MEDICINE | Facility: CLINIC | Age: 38
End: 2018-03-05
Payer: COMMERCIAL

## 2018-03-05 VITALS
HEART RATE: 75 BPM | OXYGEN SATURATION: 98 % | DIASTOLIC BLOOD PRESSURE: 73 MMHG | TEMPERATURE: 97.5 F | SYSTOLIC BLOOD PRESSURE: 110 MMHG | WEIGHT: 109.6 LBS | BODY MASS INDEX: 20.71 KG/M2 | RESPIRATION RATE: 16 BRPM

## 2018-03-05 DIAGNOSIS — N94.10 DYSPAREUNIA IN FEMALE: ICD-10-CM

## 2018-03-05 DIAGNOSIS — Z01.818 PREOP GENERAL PHYSICAL EXAM: Primary | ICD-10-CM

## 2018-03-05 PROCEDURE — 99214 OFFICE O/P EST MOD 30 MIN: CPT | Performed by: NURSE PRACTITIONER

## 2018-03-05 NOTE — MR AVS SNAPSHOT
After Visit Summary   3/5/2018    Krystyna Smith    MRN: [id number]           Patient Information     Date Of Birth          1980        Visit Information        Provider Department      3/5/2018 9:20 AM Kelsie Martinez APRN CNP [facility]        Today's Diagnoses     Preop general physical exam    -  1    Dyspareunia in female          Care Instructions      Before Your Surgery      Call your surgeon if there is any change in your health. This includes signs of a cold or flu (such as a sore throat, runny nose, cough, rash or fever).    Do not smoke, drink alcohol or take over the counter medicine (unless your surgeon or primary care doctor tells you to) for the 24 hours before and after surgery.    If you take prescribed drugs: Follow your doctor s orders about which medicines to take and which to stop until after surgery.    Eating and drinking prior to surgery: follow the instructions from your surgeon    Take a shower or bath the night before surgery. Use the soap your surgeon gave you to gently clean your skin. If you do not have soap from your surgeon, use your regular soap. Do not shave or scrub the surgery site.  Wear clean pajamas and have clean sheets on your bed.           Follow-ups after your visit        Your next 10 appointments already scheduled     Mar 14, 2018   Procedure with Trina Gaming MD   [facility], [place], Same Day Surgery (--)    [address] [address] [phone]              Who to contact     If you have questions or need follow up information about today's clinic visit or your schedule please contact [facility] directly at [phone].  Normal or non-critical lab and imaging results will be communicated to you by MyChart, letter or phone within 4 business days after the clinic has received the results. If you do not hear from us within 7 days, please contact the clinic through MyChart or phone.  If you have a critical or abnormal lab result, we will notify you by phone as soon as possible.  Submit refill requests through Ecast or call your pharmacy and they will forward the refill request to us. Please allow 3 business days for your refill to be completed.          Additional Information About Your Visit        Oculevehart Information     Ecast gives you secure access to your electronic health record. If you see a primary care provider, you can also send messages to your care team and make appointments. If you have questions, please call your primary care clinic.  If you do not have a primary care provider, please call [phone] and they will assist you.        Care EveryWhere ID     This is your Care EveryWhere ID. This could be used by other organizations to access your [facility] medical records  [id number]        Your Vitals Were     Pulse Temperature Respirations Pulse Oximetry BMI (Body Mass Index)       75 97.5  F (36.4  C) (Tympanic) 16 98% 20.71 kg/m2        Blood Pressure from Last 3 Encounters:   03/05/18 110/73   02/25/18 110/75   10/05/17 102/69    Weight from Last 3 Encounters:   03/05/18 109 lb 9.6 oz (49.7 kg)   02/25/18 110 lb (49.9 kg)   10/05/17 109 lb (49.4 kg)              Today, you had the following     No orders found for display       Primary Care Provider Office Phone # Fax #    Mehreen STEIN CORINNE Dempsey [phone] [address] [place] [address]        Equal Access to Services     VIKAS CADET AH: Hadii aad ku hadasho Soomaali, waaxda luqadaha, qaybta kaalmada adeegyada, waxay sukhdevin angelina sanders. So [facility] [phone].    ATENCIÓN: Si habla español, tiene a qureshi disposición servicios gratuitos de asistencia lingüística. Llame al [phone].    We comply with applicable federal civil rights laws and [place] laws. We do not discriminate on the basis of race, color, national origin, age, disability, sex, sexual orientation,  or gender identity.            Thank you!     Thank you for choosing [facility]  for your care. Our goal is always to provide you with excellent care. Hearing back from our patients is one way we can continue to improve our services. Please take a few minutes to complete the written survey that you may receive in the mail after your visit with us. Thank you!             Your Updated Medication List - Protect others around you: Learn how to safely use, store and throw away your medicines at www.disposemymeds.org.          This list is accurate as of 3/5/18 12:43 PM.  Always use your most recent med list.                   Brand Name Dispense Instructions for use Diagnosis    MULTIVITAMIN PO           NO ACTIVE MEDICATIONS      .        UNABLE TO FIND      MEDICATION NAME: a drink with many vitamins

## 2018-03-05 NOTE — PROGRESS NOTES
[address] [address]  [phone]  Dept: [phone]    PRE-OP EVALUATION:  Today's date: 3/5/2018    Krystyan Smith (: 1980) presents for pre-operative evaluation assessment as requested by Dr. Gaming.  She requires evaluation and anesthesia risk assessment prior to undergoing surgery/procedure for treatment of Vaginoplasty .    Fax number for surgical facility:   Primary Physician: Mehreen Dempsey  Type of Anesthesia Anticipated: General    Patient has a Health Care Directive or Living Will:  YES     Preop Questions 3/5/2018   Who is doing your surgery? [facility]   What are you having done? [facility]   Date of Surgery/Procedure:    Facility or Hospital where procedure/surgery will be performed: [address] [place] [address]   1.  Do you have a history of Heart attack, stroke, stent, coronary bypass surgery, or other heart surgery? No   2.  Do you ever have any pain or discomfort in your chest? No   3.  Do you have a history of  Heart Failure? No   4.   Are you troubled by shortness of breath when:  walking on a level surface, or up a slight hill, or at night? No   5.  Do you currently have a cold, bronchitis or other respiratory infection? No   6.  Do you have a cough, shortness of breath, or wheezing? No   7.  Do you sometimes get pains in the calves of your legs when you walk? No   8. Do you or anyone in your family have previous history of blood clots? No   9.  Do you or does anyone in your family have a serious bleeding problem such as prolonged bleeding following surgeries or cuts? No   10. Have you ever had problems with anemia or been told to take iron pills? No   11. Have you had any abnormal blood loss such as black, tarry or bloody stools, or abnormal vaginal bleeding? No   12. Have you ever had a blood transfusion? No   13. Have you or any of your relatives ever had problems with anesthesia? No   14. Do you have  sleep apnea, excessive snoring or daytime drowsiness? No   15. Do you have any prosthetic heart valves? No   16. Do you have prosthetic joints? No   17. Is there any chance that you may be pregnant? No         HPI:     HPI related to upcoming procedure: scar tissue in vagina and pain with intercourse for 13 years.        See problem list for active medical problems.  Problems all longstanding and stable, except as noted/documented.  See ROS for pertinent symptoms related to these conditions.                                                                                                  .    MEDICAL HISTORY:     Patient Active Problem List    Diagnosis Date Noted     CARDIOVASCULAR SCREENING; LDL GOAL LESS THAN 160 10/31/2010     Priority: Medium     Headache 2010     Priority: Medium     Problem list name updated by automated process. Provider to review       Amenorrhea, secondary 2010     Priority: Medium      Past Medical History:   Diagnosis Date     NO ACTIVE PROBLEMS      Past Surgical History:   Procedure Laterality Date     C/SECTION, LOW TRANSVERSE      , Low Transverse     HC ENLARGE BREAST WITH IMPLANT  2017    saline     LASIK  2017     Current Outpatient Prescriptions   Medication Sig Dispense Refill     Multiple Vitamins-Minerals (MULTIVITAMIN PO)        UNABLE TO FIND MEDICATION NAME: a drink with many vitamins       NO ACTIVE MEDICATIONS .       OTC products: None, except as noted above, no recent use of OTC ASA, NSAIDS or Steroids and no use of herbal medications or other supplements    Allergies   Allergen Reactions     Morphine Hives      Latex Allergy: NO    Social History   Substance Use Topics     Smoking status: Former Smoker     Quit date: 2005     Smokeless tobacco: Never Used     Alcohol use Yes      Comment: occ     History   Drug Use No       REVIEW OF SYSTEMS:   Constitutional, neuro, ENT, endocrine, pulmonary, cardiac, gastrointestinal, genitourinary,  musculoskeletal, integument and psychiatric systems are negative, except as otherwise noted.    EXAM:   /73 (BP Location: Right arm, Patient Position: Chair, Cuff Size: Adult Regular)  Pulse 75  Temp 97.5  F (36.4  C) (Tympanic)  Resp 16  Wt 109 lb 9.6 oz (49.7 kg)  SpO2 98%  BMI 20.71 kg/m2    GENERAL APPEARANCE: healthy, alert and no distress     EYES: EOMI, PERRL     HENT: ear canals and TM's normal and nose and mouth without ulcers or lesions     NECK: no adenopathy, no asymmetry, masses, or scars and thyroid normal to palpation     RESP: lungs clear to auscultation - no rales, rhonchi or wheezes     CV: regular rates and rhythm, normal S1 S2, no S3 or S4 and no murmur, click or rub     ABDOMEN:  soft, nontender, no HSM or masses and bowel sounds normal     MS: extremities normal- no gross deformities noted, no evidence of inflammation in joints, FROM in all extremities.     SKIN: no suspicious lesions or rashes     PSYCH: mentation appears normal. and affect normal/bright     LYMPHATICS: No cervical adenopathy    DIAGNOSTICS:   No labs or EKG required for low risk surgery (cataract, skin procedure, breast biopsy, etc)    Recent Labs   Lab Test  02/25/18   1232  08/30/17   0755  07/17/17   0919  09/09/16   1417   HGB  12.7   --   13.0  13.6   PLT  210   --    --   224   A1C   --   5.0   --   4.9        IMPRESSION:   Reason for surgery/procedure: vaginal pain due to scar tissue  Diagnosis/reason for consult: preop exam     The proposed surgical procedure is considered LOW risk.    REVISED CARDIAC RISK INDEX  The patient has the following serious cardiovascular risks for perioperative complications such as (MI, PE, VFib and 3  AV Block):  No serious cardiac risks  INTERPRETATION: 0 risks: Class I (very low risk - 0.4% complication rate)    The patient has the following additional risks for perioperative complications:  No identified additional risks      ICD-10-CM    1. Preop general physical exam  Z01.818    2. Dyspareunia in female N94.10        RECOMMENDATIONS:       --Patient is to take all scheduled medications on the day of surgery EXCEPT for modifications listed below.    APPROVAL GIVEN to proceed with proposed procedure, without further diagnostic evaluation       Signed Electronically by: CORINNE Ridley CNP    Copy of this evaluation report is provided to requesting physician.    [facility] Preop Guidelines

## 2018-03-13 ENCOUNTER — ANESTHESIA EVENT (OUTPATIENT)
Dept: SURGERY | Facility: CLINIC | Age: 38
End: 2018-03-13
Payer: COMMERCIAL

## 2018-03-14 ENCOUNTER — ANESTHESIA (OUTPATIENT)
Dept: SURGERY | Facility: CLINIC | Age: 38
End: 2018-03-14
Payer: COMMERCIAL

## 2018-03-14 ENCOUNTER — SURGERY (OUTPATIENT)
Age: 38
End: 2018-03-14

## 2018-03-14 ENCOUNTER — HOSPITAL ENCOUNTER (OUTPATIENT)
Facility: CLINIC | Age: 38
Discharge: HOME | End: 2018-03-14
Attending: OBSTETRICS & GYNECOLOGY | Admitting: OBSTETRICS & GYNECOLOGY
Payer: COMMERCIAL

## 2018-03-14 VITALS
BODY MASS INDEX: 21.69 KG/M2 | RESPIRATION RATE: 16 BRPM | TEMPERATURE: 98.4 F | HEIGHT: 61 IN | OXYGEN SATURATION: 98 % | SYSTOLIC BLOOD PRESSURE: 108 MMHG | DIASTOLIC BLOOD PRESSURE: 72 MMHG | WEIGHT: 114.86 LBS

## 2018-03-14 DIAGNOSIS — Z98.890 H/O VAGINAL SURGERY: Primary | ICD-10-CM

## 2018-03-14 LAB
GLUCOSE BLDC GLUCOMTR-MCNC: 85 MG/DL (ref 70–99)
HCG UR QL: NEGATIVE

## 2018-03-14 PROCEDURE — 25000125 ZZHC RX 250: Performed by: NURSE ANESTHETIST, CERTIFIED REGISTERED

## 2018-03-14 PROCEDURE — 25000128 H RX IP 250 OP 636: Performed by: NURSE ANESTHETIST, CERTIFIED REGISTERED

## 2018-03-14 PROCEDURE — 81025 URINE PREGNANCY TEST: CPT | Performed by: OBSTETRICS & GYNECOLOGY

## 2018-03-14 PROCEDURE — 56800 PLASTIC REPAIR INTROITUS: CPT | Mod: GC | Performed by: OBSTETRICS & GYNECOLOGY

## 2018-03-14 PROCEDURE — 40000170 ZZH STATISTIC PRE-PROCEDURE ASSESSMENT II: Performed by: OBSTETRICS & GYNECOLOGY

## 2018-03-14 PROCEDURE — 27210794 ZZH OR GENERAL SUPPLY STERILE: Performed by: OBSTETRICS & GYNECOLOGY

## 2018-03-14 PROCEDURE — 36000053 ZZH SURGERY LEVEL 2 EA 15 ADDTL MIN - UMMC: Performed by: OBSTETRICS & GYNECOLOGY

## 2018-03-14 PROCEDURE — 25000132 ZZH RX MED GY IP 250 OP 250 PS 637: Performed by: ANESTHESIOLOGY

## 2018-03-14 PROCEDURE — 37000008 ZZH ANESTHESIA TECHNICAL FEE, 1ST 30 MIN: Performed by: OBSTETRICS & GYNECOLOGY

## 2018-03-14 PROCEDURE — 37000009 ZZH ANESTHESIA TECHNICAL FEE, EACH ADDTL 15 MIN: Performed by: OBSTETRICS & GYNECOLOGY

## 2018-03-14 PROCEDURE — 25000125 ZZHC RX 250: Performed by: OBSTETRICS & GYNECOLOGY

## 2018-03-14 PROCEDURE — 71000027 ZZH RECOVERY PHASE 2 EACH 15 MINS: Performed by: OBSTETRICS & GYNECOLOGY

## 2018-03-14 PROCEDURE — 36000051 ZZH SURGERY LEVEL 2 1ST 30 MIN - UMMC: Performed by: OBSTETRICS & GYNECOLOGY

## 2018-03-14 PROCEDURE — 82962 GLUCOSE BLOOD TEST: CPT

## 2018-03-14 RX ORDER — SODIUM CHLORIDE, SODIUM LACTATE, POTASSIUM CHLORIDE, CALCIUM CHLORIDE 600; 310; 30; 20 MG/100ML; MG/100ML; MG/100ML; MG/100ML
INJECTION, SOLUTION INTRAVENOUS CONTINUOUS PRN
Status: DISCONTINUED | OUTPATIENT
Start: 2018-03-14 | End: 2018-03-14

## 2018-03-14 RX ORDER — SODIUM CHLORIDE, SODIUM LACTATE, POTASSIUM CHLORIDE, CALCIUM CHLORIDE 600; 310; 30; 20 MG/100ML; MG/100ML; MG/100ML; MG/100ML
INJECTION, SOLUTION INTRAVENOUS CONTINUOUS
Status: DISCONTINUED | OUTPATIENT
Start: 2018-03-14 | End: 2018-03-14 | Stop reason: HOSPADM

## 2018-03-14 RX ORDER — FENTANYL CITRATE 50 UG/ML
25-50 INJECTION, SOLUTION INTRAMUSCULAR; INTRAVENOUS
Status: DISCONTINUED | OUTPATIENT
Start: 2018-03-14 | End: 2018-03-14 | Stop reason: HOSPADM

## 2018-03-14 RX ORDER — ONDANSETRON 4 MG/1
4 TABLET, ORALLY DISINTEGRATING ORAL
Status: DISCONTINUED | OUTPATIENT
Start: 2018-03-14 | End: 2018-03-14 | Stop reason: HOSPADM

## 2018-03-14 RX ORDER — FENTANYL CITRATE 50 UG/ML
INJECTION, SOLUTION INTRAMUSCULAR; INTRAVENOUS PRN
Status: DISCONTINUED | OUTPATIENT
Start: 2018-03-14 | End: 2018-03-14

## 2018-03-14 RX ORDER — LIDOCAINE 40 MG/G
CREAM TOPICAL
Status: DISCONTINUED | OUTPATIENT
Start: 2018-03-14 | End: 2018-03-14 | Stop reason: HOSPADM

## 2018-03-14 RX ORDER — OXYCODONE HYDROCHLORIDE 5 MG/1
5 TABLET ORAL EVERY 4 HOURS PRN
Status: DISCONTINUED | OUTPATIENT
Start: 2018-03-14 | End: 2018-03-14 | Stop reason: HOSPADM

## 2018-03-14 RX ORDER — KETOROLAC TROMETHAMINE 30 MG/ML
INJECTION, SOLUTION INTRAMUSCULAR; INTRAVENOUS PRN
Status: DISCONTINUED | OUTPATIENT
Start: 2018-03-14 | End: 2018-03-14

## 2018-03-14 RX ORDER — ONDANSETRON 2 MG/ML
4 INJECTION INTRAMUSCULAR; INTRAVENOUS EVERY 30 MIN PRN
Status: DISCONTINUED | OUTPATIENT
Start: 2018-03-14 | End: 2018-03-14 | Stop reason: HOSPADM

## 2018-03-14 RX ORDER — LIDOCAINE HYDROCHLORIDE 20 MG/ML
INJECTION, SOLUTION INFILTRATION; PERINEURAL PRN
Status: DISCONTINUED | OUTPATIENT
Start: 2018-03-14 | End: 2018-03-14

## 2018-03-14 RX ORDER — PROPOFOL 10 MG/ML
INJECTION, EMULSION INTRAVENOUS CONTINUOUS PRN
Status: DISCONTINUED | OUTPATIENT
Start: 2018-03-14 | End: 2018-03-14

## 2018-03-14 RX ORDER — MEPERIDINE HYDROCHLORIDE 25 MG/ML
12.5 INJECTION INTRAMUSCULAR; INTRAVENOUS; SUBCUTANEOUS
Status: DISCONTINUED | OUTPATIENT
Start: 2018-03-14 | End: 2018-03-14 | Stop reason: HOSPADM

## 2018-03-14 RX ORDER — HYDROCODONE BITARTRATE AND ACETAMINOPHEN 5; 325 MG/1; MG/1
2 TABLET ORAL
Status: DISCONTINUED | OUTPATIENT
Start: 2018-03-14 | End: 2018-03-14 | Stop reason: HOSPADM

## 2018-03-14 RX ORDER — ONDANSETRON 4 MG/1
4-8 TABLET, ORALLY DISINTEGRATING ORAL EVERY 8 HOURS PRN
Qty: 4 TABLET | Refills: 0 | Status: SHIPPED | OUTPATIENT
Start: 2018-03-14 | End: 2018-11-27

## 2018-03-14 RX ORDER — CITRIC ACID/SODIUM CITRATE 334-500MG
30 SOLUTION, ORAL ORAL
Status: COMPLETED | OUTPATIENT
Start: 2018-03-14 | End: 2018-03-14

## 2018-03-14 RX ORDER — AMOXICILLIN 250 MG
1-2 CAPSULE ORAL 2 TIMES DAILY
Qty: 10 TABLET | Refills: 0 | Status: SHIPPED | OUTPATIENT
Start: 2018-03-14 | End: 2018-11-27

## 2018-03-14 RX ORDER — ACETAMINOPHEN 325 MG/1
975 TABLET ORAL ONCE
Status: DISCONTINUED | OUTPATIENT
Start: 2018-03-14 | End: 2018-03-14 | Stop reason: HOSPADM

## 2018-03-14 RX ORDER — ONDANSETRON 4 MG/1
4 TABLET, ORALLY DISINTEGRATING ORAL EVERY 30 MIN PRN
Status: DISCONTINUED | OUTPATIENT
Start: 2018-03-14 | End: 2018-03-14 | Stop reason: HOSPADM

## 2018-03-14 RX ORDER — NALOXONE HYDROCHLORIDE 0.4 MG/ML
.1-.4 INJECTION, SOLUTION INTRAMUSCULAR; INTRAVENOUS; SUBCUTANEOUS
Status: DISCONTINUED | OUTPATIENT
Start: 2018-03-14 | End: 2018-03-14 | Stop reason: HOSPADM

## 2018-03-14 RX ADMIN — FENTANYL CITRATE 50 MCG: 50 INJECTION, SOLUTION INTRAMUSCULAR; INTRAVENOUS at 10:13

## 2018-03-14 RX ADMIN — PROPOFOL 100 MCG/KG/MIN: 10 INJECTION, EMULSION INTRAVENOUS at 10:12

## 2018-03-14 RX ADMIN — FENTANYL CITRATE 25 MCG: 50 INJECTION, SOLUTION INTRAMUSCULAR; INTRAVENOUS at 10:25

## 2018-03-14 RX ADMIN — SODIUM CHLORIDE, POTASSIUM CHLORIDE, SODIUM LACTATE AND CALCIUM CHLORIDE: 600; 310; 30; 20 INJECTION, SOLUTION INTRAVENOUS at 10:00

## 2018-03-14 RX ADMIN — LIDOCAINE HYDROCHLORIDE 10 ML: 10 INJECTION, SOLUTION EPIDURAL; INFILTRATION; INTRACAUDAL; PERINEURAL at 10:25

## 2018-03-14 RX ADMIN — KETOROLAC TROMETHAMINE 30 MG: 30 INJECTION, SOLUTION INTRAMUSCULAR at 10:25

## 2018-03-14 RX ADMIN — LIDOCAINE HYDROCHLORIDE 40 MG: 20 INJECTION, SOLUTION INFILTRATION; PERINEURAL at 10:14

## 2018-03-14 RX ADMIN — MIDAZOLAM 2 MG: 1 INJECTION INTRAMUSCULAR; INTRAVENOUS at 10:00

## 2018-03-14 RX ADMIN — SODIUM CITRATE AND CITRIC ACID MONOHYDRATE 30 ML: 500; 334 SOLUTION ORAL at 10:01

## 2018-03-14 NOTE — OP NOTE
Operative Note   Name: Krystyna Smith  MRN:[id number]  : 1980  Date of Surgery: 2018    Pre-operative Diagnosis: Dyspareunia  Post-operative Diagnosis: Same, s/p below stated procedure(s)  Procedure(s): Revision of vaginal scar tissue     Surgeon: Trina Gaming MD   Assistants:   Lisa Arguello MD, PGY-4    Anesthesia: MAC with local  EBL: 10 mL   Urine Output: Not measured   Fluids: 500 mL crystalloid    Specimens: None  Complications: None apparent  Findings: EUA revealed midposition uterus, no adnexal masses appreciated.  Just deep to the posterior hymnal ring, there was a dense band of tissue.   Indications: Krystyna Smith is a 37 year old year old  woman with long standing history of dyspareunia with tight band of vaginal tissue on the posterior vagina, just deep to hymnal ring. She was counseled on the risks, benefits, and alternatives of the procedure including the change the dense tissue could reform and pain could return and/or worsen, and she consented.   Procedure: The patient was taken to the operating room where she underwent MAC anesthesia without difficulty. She was placed in the dorsal lithotomy position. An examination was done and it was noted that her uterus was mid position and the tight band of vaginal tissue (as outlined above) was palpated. She was prepped and draped in the usual sterile fashion.  A total of 10 cc of 1% lidocaine was injected into the perineum.  Using a curved cain, a midline episiotomy was performed which allowed relaxation of the tissue.  The deep tissue was then re approximated using a 2-0 Vicryl in a running locked fashion.  The vaginal mucosa was re approximated using a 4-0 Vicryl in a running locked fashion.  Hemostasis was noted.   he patient tolerated the procedure well and was taken to the recovery area in stable condition.     Dr. Gaming was present for the entire procedure    Lisa Arguello MD   OB/Gyn Resident, PGY-4  2018,  10:36 AM     I was present and scrubbed for entirety of the surgical case and  agree with note as edited to reflect findings.      DAVID FAN

## 2018-03-14 NOTE — IP AVS SNAPSHOT
MRN:[id number]                      After Visit Summary   3/14/2018    Krystyna Smith    MRN: [id number]           Thank you!     Thank you for choosing [facility] for your care. Our goal is always to provide you with excellent care. Hearing back from our patients is one way we can continue to improve our services. Please take a few minutes to complete the written survey that you may receive in the mail after you visit with us. Thank you!        Patient Information     Date Of Birth          1980        About your hospital stay     You were admitted on:  March 14, 2018 You last received care in the:  [facility], [place]    You were discharged on:  March 14, 2018       Who to Call     For medical emergencies, please call 911.  For non-urgent questions about your medical care, please call your primary care provider or clinic, [phone]  For questions related to your surgery, please call your surgery clinic        Attending Provider     Provider Specialty    Trina Gaming MD OB/Gyn       Primary Care Provider Office Phone # Fax #    CORINNE [facility] [phone]      After Care Instructions     Discharge Instructions       Resume pre procedure diet            Discharge Instructions       Pelvic Rest. No tampons, douching or intercourse for  6  weeks.            Discharge Instructions       Patient to arrange follow up appointment in 6  weeks            Ice to affected area       PRN as tolerated            No alcohol       NO ALCOHOL for 24 hours post procedure            No driving or operating machinery       No driving or operating machinery until day after procedure            No lifting       No lifting over 220 pounds and no strenuous physical activity.  For 2-4 weeks                  Further instructions from your care team       Same-Day Surgery   Adult Discharge Orders & Instructions     For 24 hours after surgery:  1. Get plenty of rest.  A responsible  adult must stay with you for at least 24 hours after you leave the hospital.   2. Pain medication can slow your reflexes. Do not drive or use heavy equipment.  If you have weakness or tingling, don't drive or use heavy equipment until this feeling goes away.  3. Mixing alcohol and pain medication can cause dizziness and slow your breathing. It can even be fatal. Do not drink alcohol while taking pain medication.  4. Avoid strenuous or risky activities.  Ask for help when climbing stairs.   5. You may feel lightheaded.  If so, sit for a few minutes before standing.  Have someone help you get up.   6. If you have nausea (feel sick to your stomach), drink only clear liquids such as apple juice, ginger ale, broth or 7-Up.  Rest may also help.  Be sure to drink enough fluids.  Move to a regular diet as you feel able. Take pain medications with a small amount of solid food, such as toast or crackers, to avoid nausea.   7. A slight fever is normal. Call the doctor if your fever is over 100 F (37.7 C) (taken under the tongue) or lasts longer than 24 hours.  8. You may have a dry mouth, muscle aches, trouble sleeping or a sore throat.  These symptoms should go away after 24 hours.  9. Do not make important or legal decisions.   Pain Management:      1. Take pain medication (if prescribed) for pain as directed by your physician.        2. WARNING: If the pain medication you have been prescribed contains Tylenol  (acetaminophen), DO NOT take additional doses of Tylenol (acetaminophen).     Call your doctor for any of the followin.  Signs of infection (fever, growing tenderness at the surgery site, severe pain, a large amount of drainage or bleeding, foul-smelling drainage, redness, swelling).    2.  It has been over 8 to 10 hours since surgery and you are still not able to urinate (pee).    3.  Headache for over 24 hours.    4.  Numbness, tingling or weakness the day after surgery (if you had spinal anesthesia).  To  "contact a doctor, call _____________________________________ or:      [phone] and ask for the Resident On Call for:          __________________________________________ (answered 24 hours a day)      Emergency Department:  [facility]: [phone]  [facility]: [phone]               Rev. 10/2014       Pending Results     No orders found from 3/12/2018 to 3/15/2018.            Admission Information     Date & Time Provider Department Dept. Phone    3/14/2018 Trina Gaming MD [facility] MAIN [phone]      Your Vitals Were     Blood Pressure Temperature Respirations Height Weight Pulse Oximetry    107/87 (Cuff Size: Adult Regular) 98.6  F (37  C) (Oral) 16 1.549 m (5' 1\") 52.1 kg (114 lb 13.8 oz) 98%    BMI (Body Mass Index)                   21.7 kg/m2           MyChart Information     [facility] gives you secure access to your electronic health record. If you see a primary care provider, you can also send messages to your care team and make appointments. If you have questions, please call your primary care clinic.  If you do not have a primary care provider, please call [phone] and they will assist you.        Care EveryWhere ID     This is your Care EveryWhere ID. This could be used by other organizations to access your [facility] medical records  [id number]        Equal Access to Services     [facility] campus[facility]OBBIE : Christiana Yarbrough, antonio layton, abilio crews. So [facility] [phone].    ATENCIÓN: Si habla español, tiene a qureshi disposición servicios gratuitos de asistencia lingüística. Llame al [phone].    We comply with applicable federal civil rights laws and [place] laws. We do not discriminate on the basis of race, color, national origin, age, disability, sex, sexual orientation, or gender identity.               Review of your medicines      START taking        Dose / Directions    " ondansetron 4 MG ODT tab   Commonly known as:  ZOFRAN-ODT   Used for:  H/O vaginal surgery        Dose:  4-8 mg   Take 1-2 tablets (4-8 mg) by mouth every 8 hours as needed for nausea Dissolve ON the tongue.   Quantity:  4 tablet   Refills:  0       senna-docusate 8.6-50 MG per tablet   Commonly known as:  SENOKOT-S;PERICOLACE   Used for:  H/O vaginal surgery        Dose:  1-2 tablet   Take 1-2 tablets by mouth 2 times daily Take while on oral narcotics to prevent or treat constipation.   Quantity:  10 tablet   Refills:  0         CONTINUE these medicines which have NOT CHANGED        Dose / Directions    MULTIVITAMIN PO        Refills:  0       NO ACTIVE MEDICATIONS        .   Refills:  0       UNABLE TO FIND        MEDICATION NAME: a drink with many vitamins   Refills:  0            Where to get your medicines      These medications were sent to [facility] [place], [place] - [address]  [address] [place] [address], [facility] [address]     Phone:  [phone]     ondansetron 4 MG ODT tab    senna-docusate 8.6-50 MG per tablet                Protect others around you: Learn how to safely use, store and throw away your medicines at www.disposemymeds.org.             Medication List: This is a list of all your medications and when to take them. Check marks below indicate your daily home schedule. Keep this list as a reference.      Medications           Morning Afternoon Evening Bedtime As Needed    MULTIVITAMIN PO                                NO ACTIVE MEDICATIONS   .                                ondansetron 4 MG ODT tab   Commonly known as:  ZOFRAN-ODT   Take 1-2 tablets (4-8 mg) by mouth every 8 hours as needed for nausea Dissolve ON the tongue.                                senna-docusate 8.6-50 MG per tablet   Commonly known as:  SENOKOT-S;PERICOLACE   Take 1-2 tablets by mouth 2 times daily Take while on oral narcotics to prevent or treat constipation.                                 UNABLE TO FIND   MEDICATION NAME: a drink with many vitamins

## 2018-03-14 NOTE — ANESTHESIA CARE TRANSFER NOTE
Patient: Krystyna Smith    Procedure(s):  Revision Of Vaginal Scar Tissue  - Wound Class: II-Clean Contaminated    Diagnosis: Dyspareunia   Diagnosis Additional Information: No value filed.    Anesthesia Type:   MAC     Note:  Airway :Face Mask  Patient transferred to:Phase II  Handoff Report: Identifed the Patient, Identified the Reponsible Provider, Reviewed the pertinent medical history, Discussed the surgical course, Reviewed Intra-OP anesthesia mangement and issues during anesthesia, Set expectations for post-procedure period and Allowed opportunity for questions and acknowledgement of understanding      Vitals: (Last set prior to Anesthesia Care Transfer)    CRNA VITALS  3/14/2018 1015 - 3/14/2018 1045      3/14/2018             Pulse: 60    SpO2: 99 %    Resp Rate (observed): 16    EKG: NSR                Electronically Signed By: CORINNE Welsh CRNA  March 14, 2018  10:45 AM

## 2018-03-14 NOTE — ANESTHESIA PREPROCEDURE EVALUATION
Anesthesia Evaluation     . Pt has had prior anesthetic. Type: General    No history of anesthetic complications          ROS/MED HX    ENT/Pulmonary: Comment: Pt has had a cough for one week.  No green sputum, no fevers. Lungs BCTA     (-) asthma   Neurologic: Comment: H/O HA.      Cardiovascular:  - neg cardiovascular ROS       METS/Exercise Tolerance:  >4 METS   Hematologic:  - neg hematologic  ROS       Musculoskeletal:  - neg musculoskeletal ROS       GI/Hepatic: Comment: Pt untreated chronic intermittent constipation    (+) GERD (Untreated, positive for acid reflux once a month) Symptomatic,       Renal/Genitourinary:  - ROS Renal section negative       Endo:  - neg endo ROS       Psychiatric:  - neg psychiatric ROS       Infectious Disease:  - neg infectious disease ROS       Malignancy:      - no malignancy   Other:    (+) No chance of pregnancy C-spine cleared: N/A, no H/O Chronic Pain,no other significant disability                    Physical Exam  Normal systems: cardiovascular, pulmonary and dental    Airway   Mallampati: I  TM distance: >3 FB  Neck ROM: full    Dental     Cardiovascular   Rhythm and rate: regular and normal      Pulmonary    breath sounds clear to auscultation                    Anesthesia Plan      History & Physical Review  History and physical reviewed and following examination; no interval change.    ASA Status:  2 .    NPO Status:  > 8 hours and > 2 hours    Plan for MAC with Intravenous and Propofol induction. Reason for MAC:  Deep or markedly invasive procedure (G8)  PONV prophylaxis:  Ondansetron (or other 5HT-3)  Reviewed sedation and GA risks including possible intraop awareness, N/V, tooth damage, heart and lung issues.  All questions answered.  Pt agrees with plan and wishes to proceed.      Postoperative Care  Postoperative pain management:  IV analgesics and Oral pain medications.      Consents  Anesthetic plan, risks, benefits and alternatives discussed with:  Patient  and Spouse..                          .

## 2018-03-14 NOTE — ANESTHESIA POSTPROCEDURE EVALUATION
Patient: Krystyna Smith    Procedure(s):  Revision Of Vaginal Scar Tissue  - Wound Class: II-Clean Contaminated    Diagnosis:Dyspareunia   Diagnosis Additional Information: No value filed.    Anesthesia Type:  MAC    Note:  Anesthesia Post Evaluation    Patient location during evaluation: Bedside and Phase 2  Patient participation: Able to fully participate in evaluation  Level of consciousness: awake and alert  Pain management: adequate  Airway patency: patent  Cardiovascular status: acceptable  Respiratory status: acceptable  Hydration status: balanced  PONV: none     Anesthetic complications: None          Last vitals:  Vitals:    03/14/18 0800   BP: 119/81   Resp: 18   Temp: 36.5  C (97.7  F)   SpO2: 100%         Electronically Signed By: Bere Rodriguez MD  March 14, 2018  10:49 AM

## 2018-03-14 NOTE — DISCHARGE INSTRUCTIONS
Same-Day Surgery   Adult Discharge Orders & Instructions     For 24 hours after surgery:  1. Get plenty of rest.  A responsible adult must stay with you for at least 24 hours after you leave the hospital.   2. Pain medication can slow your reflexes. Do not drive or use heavy equipment.  If you have weakness or tingling, don't drive or use heavy equipment until this feeling goes away.  3. Mixing alcohol and pain medication can cause dizziness and slow your breathing. It can even be fatal. Do not drink alcohol while taking pain medication.  4. Avoid strenuous or risky activities.  Ask for help when climbing stairs.   5. You may feel lightheaded.  If so, sit for a few minutes before standing.  Have someone help you get up.   6. If you have nausea (feel sick to your stomach), drink only clear liquids such as apple juice, ginger ale, broth or 7-Up.  Rest may also help.  Be sure to drink enough fluids.  Move to a regular diet as you feel able. Take pain medications with a small amount of solid food, such as toast or crackers, to avoid nausea.   7. A slight fever is normal. Call the doctor if your fever is over 100 F (37.7 C) (taken under the tongue) or lasts longer than 24 hours.  8. You may have a dry mouth, muscle aches, trouble sleeping or a sore throat.  These symptoms should go away after 24 hours.  9. Do not make important or legal decisions.   Pain Management:      1. Take pain medication (if prescribed) for pain as directed by your physician.        2. WARNING: If the pain medication you have been prescribed contains Tylenol  (acetaminophen), DO NOT take additional doses of Tylenol (acetaminophen).     Call your doctor for any of the followin.  Signs of infection (fever, growing tenderness at the surgery site, severe pain, a large amount of drainage or bleeding, foul-smelling drainage, redness, swelling).    2.  It has been over 8 to 10 hours since surgery and you are still not able to urinate (pee).    3.   Headache for over 24 hours.    4.  Numbness, tingling or weakness the day after surgery (if you had spinal anesthesia).  To contact a doctor, call _____________________________________ or:      [phone] and ask for the Resident On Call for:          __________________________________________ (answered 24 hours a day)      Emergency Department:  [facility]: [phone]  [facility]: [phone]               Rev. 10/2014

## 2018-03-14 NOTE — ANESTHESIA POSTPROCEDURE EVALUATION
Patient: Krystyna Smith    Procedure(s):  Revision Of Vaginal Scar Tissue  - Wound Class: II-Clean Contaminated    Diagnosis:Dyspareunia   Diagnosis Additional Information: No value filed.    Anesthesia Type:  MAC    Note:  Anesthesia Post Evaluation    Patient location during evaluation: Phase 2 and Bedside  Patient participation: Able to fully participate in evaluation  Level of consciousness: awake and alert  Pain management: adequate  Airway patency: patent  Cardiovascular status: acceptable  Respiratory status: acceptable  Hydration status: balanced  PONV: none     Anesthetic complications: None          Last vitals:  Vitals:    03/14/18 1115 03/14/18 1130 03/14/18 1145   BP: 111/78 108/72    Resp:  16 16   Temp: 36.9  C (98.4  F)     SpO2: 97% 98% 98%         Electronically Signed By: Bere Rodriguez MD  March 14, 2018  12:05 PM

## 2018-11-27 ENCOUNTER — OFFICE VISIT (OUTPATIENT)
Dept: FAMILY MEDICINE | Facility: CLINIC | Age: 38
End: 2018-11-27
Payer: COMMERCIAL

## 2018-11-27 VITALS
BODY MASS INDEX: 20.2 KG/M2 | OXYGEN SATURATION: 99 % | DIASTOLIC BLOOD PRESSURE: 72 MMHG | HEIGHT: 61 IN | HEART RATE: 80 BPM | RESPIRATION RATE: 16 BRPM | SYSTOLIC BLOOD PRESSURE: 110 MMHG | TEMPERATURE: 98 F | WEIGHT: 107 LBS

## 2018-11-27 DIAGNOSIS — Z12.4 SCREENING FOR MALIGNANT NEOPLASM OF CERVIX: ICD-10-CM

## 2018-11-27 DIAGNOSIS — Z30.49 ENCOUNTER FOR INITIAL MANAGEMENT OF NUVARING: ICD-10-CM

## 2018-11-27 DIAGNOSIS — Z00.00 ROUTINE GENERAL MEDICAL EXAMINATION AT A HEALTH CARE FACILITY: Primary | ICD-10-CM

## 2018-11-27 PROCEDURE — 99395 PREV VISIT EST AGE 18-39: CPT | Performed by: NURSE PRACTITIONER

## 2018-11-27 PROCEDURE — G0145 SCR C/V CYTO,THINLAYER,RESCR: HCPCS | Performed by: NURSE PRACTITIONER

## 2018-11-27 PROCEDURE — 87624 HPV HI-RISK TYP POOLED RSLT: CPT | Performed by: NURSE PRACTITIONER

## 2018-11-27 RX ORDER — ETONOGESTREL AND ETHINYL ESTRADIOL VAGINAL RING .015; .12 MG/D; MG/D
RING VAGINAL
Qty: 3 EACH | Refills: 4 | Status: ON HOLD | OUTPATIENT
Start: 2018-11-27 | End: 2019-01-20

## 2018-11-27 ASSESSMENT — ENCOUNTER SYMPTOMS
MYALGIAS: 0
BREAST MASS: 0
PALPITATIONS: 0
EYE PAIN: 0
HEADACHES: 0
SORE THROAT: 0
HEMATURIA: 0
COUGH: 0
NAUSEA: 0
DIZZINESS: 0
ABDOMINAL PAIN: 0
HEARTBURN: 0
CONSTIPATION: 0
FREQUENCY: 0
DYSURIA: 0
HEMATOCHEZIA: 0
DIARRHEA: 0
SHORTNESS OF BREATH: 0
JOINT SWELLING: 0
ARTHRALGIAS: 0
PARESTHESIAS: 0
CHILLS: 0
FEVER: 0
WEAKNESS: 0

## 2018-11-27 NOTE — MR AVS SNAPSHOT
After Visit Summary   11/27/2018    Krystyna Smith    MRN: [id number]           Patient Information     Date Of Birth          1980        Visit Information        Provider Department      11/27/2018 8:40 AM Mehreen Dempsey APRN [facility]        Today's Diagnoses     Routine general medical examination at a health care facility    -  1    Encounter for initial management of nuvaring        Screening for malignant neoplasm of cervix          Care Instructions        NuvaRing  What is this medicine?  ETHINYL ESTRADIOL; ETONOGESTREL (ETH in il es tra DYE ole; et oh trent CRISTINA trel) vaginal ring is a flexible, vaginal ring used as a contraceptive (birth control method). This medicine combines two types of female hormones, an estrogen and a progestin. This ring is used to prevent ovulation and pregnancy. Each ring is effective for one month.  This medicine may be used for other purposes; ask your health care provider or pharmacist if you have questions.  What should I tell my health care provider before I take this medicine?  They need to know if you have or ever had any of these conditions:    abnormal vaginal bleeding    blood vessel disease or blood clots    breast, cervical, endometrial, ovarian, liver, or uterine cancer    diabetes    gallbladder disease    heart disease or recent heart attack    high blood pressure    high cholesterol    kidney disease    liver disease    migraine headaches    stroke    systemic lupus erythematosus (SLE)    tobacco smoker    an unusual or allergic reaction to estrogens, progestins, other medicines, foods, dyes, or preservatives    pregnant or trying to get pregnant    breast-feeding  How should I use this medicine?  Insert the ring into your vagina as directed. Follow the directions on the prescription label. The ring will remain place for 3 weeks and is then removed for a 1-week break. A new ring is inserted 1 week after the last  ring was removed, on the same day of the week. Do not use more often than directed.  A patient package insert for the product will be given with each prescription and refill. Read this sheet carefully each time. The sheet may change frequently.  Contact your pediatrician regarding the use of this medicine in children. Special care may be needed. This medicine has been used in female children who have started having menstrual periods.  Overdosage: If you think you have taken too much of this medicine contact a poison control center or emergency room at once.  NOTE: This medicine is only for you. Do not share this medicine with others.  What if I miss a dose?  You will need to replace your vaginal ring once a month as directed. If the ring should slip out, or if you leave it in longer or shorter than you should, contact your health care professional for advice.  What may interact with this medicine?    acetaminophen    antibiotics or medicines for infections, especially rifampin, rifabutin, rifapentine, and griseofulvin, and possibly penicillins or tetracyclines    aprepitant    ascorbic acid (vitamin C)    atorvastatin    barbiturate medicines, such as phenobarbital    bosentan    carbamazepine    caffeine    clofibrate    cyclosporine    dantrolene    doxercalciferol    felbamate    grapefruit juice    hydrocortisone    medicines for anxiety or sleeping problems, such as diazepam or temazepam    medicines for diabetes, including pioglitazone    modafinil    mycophenolate    nefazodone    oxcarbazepine    phenytoin    prednisolone    ritonavir or other medicines for HIV infection or AIDS    rosuvastatin    selegiline    soy isoflavones supplements    [place]'s wort    tamoxifen or raloxifene    theophylline    thyroid hormones    topiramate    warfarin  This list may not describe all possible interactions. Give your health care provider a list of all the medicines, herbs, non-prescription drugs, or dietary  supplements you use. Also tell them if you smoke, drink alcohol, or use illegal drugs. Some items may interact with your medicine.  What should I watch for while using this medicine?  Visit your doctor or health care professional for regular checks on your progress. You will need a regular breast and pelvic exam and Pap smear while on this medicine.  Use an additional method of contraception during the first cycle that you use this ring.  If you have any reason to think you are pregnant, stop using this medicine right away and contact your doctor or health care professional.  If you are using this medicine for hormone related problems, it may take several cycles of use to see improvement in your condition.  Smoking increases the risk of getting a blood clot or having a stroke while you are using hormonal birth control, especially if you are more than 35 years old. You are strongly advised not to smoke.  This medicine can make your body retain fluid, making your fingers, hands, or ankles swell. Your blood pressure can go up. Contact your doctor or health care professional if you feel you are retaining fluid.  This medicine can make you more sensitive to the sun. Keep out of the sun. If you cannot avoid being in the sun, wear protective clothing and use sunscreen. Do not use sun lamps or tanning beds/booths.  If you wear contact lenses and notice visual changes, or if the lenses begin to feel uncomfortable, consult your eye care specialist.  In some women, tenderness, swelling, or minor bleeding of the gums may occur. Notify your dentist if this happens. Brushing and flossing your teeth regularly may help limit this. See your dentist regularly and inform your dentist of the medicines you are taking.  If you are going to have elective surgery, you may need to stop using this medicine before the surgery. Consult your health care professional for advice.  This medicine does not protect you against HIV infection (AIDS) or  any other sexually transmitted diseases.  What side effects may I notice from receiving this medicine?  Side effects that you should report to your doctor or health care professional as soon as possible:    breast tissue changes or discharge    changes in vaginal bleeding during your period or between your periods    chest pain    coughing up blood    dizziness or fainting spells    headaches or migraines    leg, arm or groin pain    severe or sudden headaches    stomach pain (severe)    sudden shortness of breath    sudden loss of coordination, especially on one side of the body    speech problems    symptoms of vaginal infection like itching, irritation or unusual discharge    tenderness in the upper abdomen    vomiting    weakness or numbness in the arms or legs, especially on one side of the body    yellowing of the eyes or skin  Side effects that usually do not require medical attention (report to your doctor or health care professional if they continue or are bothersome):    breakthrough bleeding and spotting that continues beyond the 3 initial cycles of pills    breast tenderness    mood changes, anxiety, depression, frustration, anger, or emotional outbursts    increased sensitivity to sun or ultraviolet light    nausea    skin rash, acne, or brown spots on the skin    weight gain (slight)  This list may not describe all possible side effects. Call your doctor for medical advice about side effects. You may report side effects to FDA at 3-895-FDA-8734.  Where should I keep my medicine?  Keep out of the reach of children.  Store at room temperature between 15 and 30 degrees C (59 and 86 degrees F) for up to 4 months. The product will  after 4 months. Protect from light. Throw away any unused medicine after the expiration date.  NOTE:This sheet is a summary. It may not cover all possible information. If you have questions about this medicine, talk to your doctor, pharmacist, or health care provider.  Copyright  2016 [employer] Standard            Preventive Health Recommendations  Female Ages 26 - 39  Yearly exam:   See your health care provider every year in order to    Review health changes.     Discuss preventive care.      Review your medicines if you your doctor has prescribed any.    Until age 30: Get a Pap test every three years (more often if you have had an abnormal result).    After age 30: Talk to your doctor about whether you should have a Pap test every 3 years or have a Pap test with HPV screening every 5 years.   You do not need a Pap test if your uterus was removed (hysterectomy) and you have not had cancer.  You should be tested each year for STDs (sexually transmitted diseases), if you're at risk.   Talk to your provider about how often to have your cholesterol checked.  If you are at risk for diabetes, you should have a diabetes test (fasting glucose).  Shots: Get a flu shot each year. Get a tetanus shot every 10 years.   Nutrition:     Eat at least 5 servings of fruits and vegetables each day.    Eat whole-grain bread, whole-wheat pasta and brown rice instead of white grains and rice.    Get adequate Calcium and Vitamin D.     Lifestyle    Exercise at least 150 minutes a week (30 minutes a day, 5 days of the week). This will help you control your weight and prevent disease.    Limit alcohol to one drink per day.    No smoking.     Wear sunscreen to prevent skin cancer.    See your dentist every six months for an exam and cleaning.          Follow-ups after your visit        Who to contact     If you have questions or need follow up information about today's clinic visit or your schedule please contact [facility] directly at [phone].  Normal or non-critical lab and imaging results will be communicated to you by MyChart, letter or phone within 4 business days after the clinic has received the results. If you do not hear from us within 7 days, please contact the clinic through  "MyChart or phone. If you have a critical or abnormal lab result, we will notify you by phone as soon as possible.  Submit refill requests through [employer] or call your pharmacy and they will forward the refill request to us. Please allow 3 business days for your refill to be completed.          Additional Information About Your Visit        [employer] Information     [employer] gives you secure access to your electronic health record. If you see a primary care provider, you can also send messages to your care team and make appointments. If you have questions, please call your primary care clinic.  If you do not have a primary care provider, please call [phone] and they will assist you.        Care EveryWhere ID     This is your Care EveryWhere ID. This could be used by other organizations to access your [facility] medical records  [id number]        Your Vitals Were     Pulse Temperature Respirations Height Last Period Pulse Oximetry    80 98  F (36.7  C) 16 5' 1\" (1.549 m) 11/11/2018 99%    Breastfeeding? BMI (Body Mass Index)                No 20.22 kg/m2           Blood Pressure from Last 3 Encounters:   11/27/18 110/72   03/14/18 108/72   03/05/18 110/73    Weight from Last 3 Encounters:   11/27/18 107 lb (48.5 kg)   03/14/18 114 lb 13.8 oz (52.1 kg)   03/05/18 109 lb 9.6 oz (49.7 kg)              We Performed the Following     Pap imaged thin layer screen with HPV - recommended age 30 - 65 years (select HPV order below)          Today's Medication Changes          These changes are accurate as of 11/27/18  9:15 AM.  If you have any questions, ask your nurse or doctor.               Start taking these medicines.        Dose/Directions    etonogestrel-ethinyl estradiol 0.12-0.015 MG/24HR vaginal ring   Commonly known as:  NUVARING   Used for:  Encounter for initial management of nuvaring   Started by:  Mehreen Dempsey APRN CNP        Insert 1 ring vaginally every 21 days then remove for 1 week then repeat " with new ring.   Quantity:  3 each   Refills:  4         Stop taking these medicines if you haven't already. Please contact your care team if you have questions.     NO ACTIVE MEDICATIONS   Stopped by:  Mehreen Dempsey APRN CNP           ondansetron 4 MG ODT tab   Commonly known as:  ZOFRAN-ODT   Stopped by:  Mehreen Dempsey APRN CNP           senna-docusate 8.6-50 MG per tablet   Commonly known as:  SENOKOT-S;PERICOLACE   Stopped by:  Mehreen Dempsey APRN CNP                Where to get your medicines      These medications were sent to [facility] - [place], [place] - [address]  [address], [place] [address]     Phone:  [phone]     etonogestrel-ethinyl estradiol 0.12-0.015 MG/24HR vaginal ring                Primary Care Provider Office Phone # Fax #    CORINNE Luque [phone]       [address] [place] [address]        Equal Access to Services     VIKAS CADET : Hadii giorgi ku hadasho Soomaali, waaxda luqadaha, qaybta kaalmada adeegyada, abilio craven . So [facility] [phone].    ATENCIÓN: Si habla español, tiene a qureshi disposición servicios gratuitos de asistencia lingüística. [facility] [phone].    We comply with applicable federal civil rights laws and [place] laws. We do not discriminate on the basis of race, color, national origin, age, disability, sex, sexual orientation, or gender identity.            Thank you!     Thank you for choosing [facility]  for your care. Our goal is always to provide you with excellent care. Hearing back from our patients is one way we can continue to improve our services. Please take a few minutes to complete the written survey that you may receive in the mail after your visit with us. Thank you!             Your Updated Medication List - Protect others around you: Learn how to safely use, store and throw away your medicines  at www.disposemymeds.org.          This list is accurate as of 11/27/18  9:15 AM.  Always use your most recent med list.                   Brand Name Dispense Instructions for use Diagnosis    etonogestrel-ethinyl estradiol 0.12-0.015 MG/24HR vaginal ring    NUVARING    3 each    Insert 1 ring vaginally every 21 days then remove for 1 week then repeat with new ring.    Encounter for initial management of nuvaring       MULTIVITAMIN PO           UNABLE TO FIND      MEDICATION NAME: a drink with many vitamins

## 2018-11-27 NOTE — PATIENT INSTRUCTIONS
NuvaRing  What is this medicine?  ETHINYL ESTRADIOL; ETONOGESTREL (ETH in il es tra DYE ole; et oh trent CRISTINA trel) vaginal ring is a flexible, vaginal ring used as a contraceptive (birth control method). This medicine combines two types of female hormones, an estrogen and a progestin. This ring is used to prevent ovulation and pregnancy. Each ring is effective for one month.  This medicine may be used for other purposes; ask your health care provider or pharmacist if you have questions.  What should I tell my health care provider before I take this medicine?  They need to know if you have or ever had any of these conditions:    abnormal vaginal bleeding    blood vessel disease or blood clots    breast, cervical, endometrial, ovarian, liver, or uterine cancer    diabetes    gallbladder disease    heart disease or recent heart attack    high blood pressure    high cholesterol    kidney disease    liver disease    migraine headaches    stroke    systemic lupus erythematosus (SLE)    tobacco smoker    an unusual or allergic reaction to estrogens, progestins, other medicines, foods, dyes, or preservatives    pregnant or trying to get pregnant    breast-feeding  How should I use this medicine?  Insert the ring into your vagina as directed. Follow the directions on the prescription label. The ring will remain place for 3 weeks and is then removed for a 1-week break. A new ring is inserted 1 week after the last ring was removed, on the same day of the week. Do not use more often than directed.  A patient package insert for the product will be given with each prescription and refill. Read this sheet carefully each time. The sheet may change frequently.  Contact your pediatrician regarding the use of this medicine in children. Special care may be needed. This medicine has been used in female children who have started having menstrual periods.  Overdosage: If you think you have taken too much of this medicine contact a  poison control center or emergency room at once.  NOTE: This medicine is only for you. Do not share this medicine with others.  What if I miss a dose?  You will need to replace your vaginal ring once a month as directed. If the ring should slip out, or if you leave it in longer or shorter than you should, contact your health care professional for advice.  What may interact with this medicine?    acetaminophen    antibiotics or medicines for infections, especially rifampin, rifabutin, rifapentine, and griseofulvin, and possibly penicillins or tetracyclines    aprepitant    ascorbic acid (vitamin C)    atorvastatin    barbiturate medicines, such as phenobarbital    bosentan    carbamazepine    caffeine    clofibrate    cyclosporine    dantrolene    doxercalciferol    felbamate    grapefruit juice    hydrocortisone    medicines for anxiety or sleeping problems, such as diazepam or temazepam    medicines for diabetes, including pioglitazone    modafinil    mycophenolate    nefazodone    oxcarbazepine    phenytoin    prednisolone    ritonavir or other medicines for HIV infection or AIDS    rosuvastatin    selegiline    soy isoflavones supplements    [place]'s wort    tamoxifen or raloxifene    theophylline    thyroid hormones    topiramate    warfarin  This list may not describe all possible interactions. Give your health care provider a list of all the medicines, herbs, non-prescription drugs, or dietary supplements you use. Also tell them if you smoke, drink alcohol, or use illegal drugs. Some items may interact with your medicine.  What should I watch for while using this medicine?  Visit your doctor or health care professional for regular checks on your progress. You will need a regular breast and pelvic exam and Pap smear while on this medicine.  Use an additional method of contraception during the first cycle that you use this ring.  If you have any reason to think you are pregnant, stop using this medicine right  away and contact your doctor or health care professional.  If you are using this medicine for hormone related problems, it may take several cycles of use to see improvement in your condition.  Smoking increases the risk of getting a blood clot or having a stroke while you are using hormonal birth control, especially if you are more than 35 years old. You are strongly advised not to smoke.  This medicine can make your body retain fluid, making your fingers, hands, or ankles swell. Your blood pressure can go up. Contact your doctor or health care professional if you feel you are retaining fluid.  This medicine can make you more sensitive to the sun. Keep out of the sun. If you cannot avoid being in the sun, wear protective clothing and use sunscreen. Do not use sun lamps or tanning beds/booths.  If you wear contact lenses and notice visual changes, or if the lenses begin to feel uncomfortable, consult your eye care specialist.  In some women, tenderness, swelling, or minor bleeding of the gums may occur. Notify your dentist if this happens. Brushing and flossing your teeth regularly may help limit this. See your dentist regularly and inform your dentist of the medicines you are taking.  If you are going to have elective surgery, you may need to stop using this medicine before the surgery. Consult your health care professional for advice.  This medicine does not protect you against HIV infection (AIDS) or any other sexually transmitted diseases.  What side effects may I notice from receiving this medicine?  Side effects that you should report to your doctor or health care professional as soon as possible:    breast tissue changes or discharge    changes in vaginal bleeding during your period or between your periods    chest pain    coughing up blood    dizziness or fainting spells    headaches or migraines    leg, arm or groin pain    severe or sudden headaches    stomach pain (severe)    sudden shortness of  breath    sudden loss of coordination, especially on one side of the body    speech problems    symptoms of vaginal infection like itching, irritation or unusual discharge    tenderness in the upper abdomen    vomiting    weakness or numbness in the arms or legs, especially on one side of the body    yellowing of the eyes or skin  Side effects that usually do not require medical attention (report to your doctor or health care professional if they continue or are bothersome):    breakthrough bleeding and spotting that continues beyond the 3 initial cycles of pills    breast tenderness    mood changes, anxiety, depression, frustration, anger, or emotional outbursts    increased sensitivity to sun or ultraviolet light    nausea    skin rash, acne, or brown spots on the skin    weight gain (slight)  This list may not describe all possible side effects. Call your doctor for medical advice about side effects. You may report side effects to FDA at 3-958-FDA-2726.  Where should I keep my medicine?  Keep out of the reach of children.  Store at room temperature between 15 and 30 degrees C (59 and 86 degrees F) for up to 4 months. The product will  after 4 months. Protect from light. Throw away any unused medicine after the expiration date.  NOTE:This sheet is a summary. It may not cover all possible information. If you have questions about this medicine, talk to your doctor, pharmacist, or health care provider. Copyright  2016 [employer] Standard            Preventive Health Recommendations  Female Ages 26 - 39  Yearly exam:   See your health care provider every year in order to    Review health changes.     Discuss preventive care.      Review your medicines if you your doctor has prescribed any.    Until age 30: Get a Pap test every three years (more often if you have had an abnormal result).    After age 30: Talk to your doctor about whether you should have a Pap test every 3 years or have a Pap test with HPV screening  every 5 years.   You do not need a Pap test if your uterus was removed (hysterectomy) and you have not had cancer.  You should be tested each year for STDs (sexually transmitted diseases), if you're at risk.   Talk to your provider about how often to have your cholesterol checked.  If you are at risk for diabetes, you should have a diabetes test (fasting glucose).  Shots: Get a flu shot each year. Get a tetanus shot every 10 years.   Nutrition:     Eat at least 5 servings of fruits and vegetables each day.    Eat whole-grain bread, whole-wheat pasta and brown rice instead of white grains and rice.    Get adequate Calcium and Vitamin D.     Lifestyle    Exercise at least 150 minutes a week (30 minutes a day, 5 days of the week). This will help you control your weight and prevent disease.    Limit alcohol to one drink per day.    No smoking.     Wear sunscreen to prevent skin cancer.    See your dentist every six months for an exam and cleaning.

## 2018-11-27 NOTE — PROGRESS NOTES
SUBJECTIVE:   CC: Krystyna Smith is an 38 year old woman who presents for preventive health visit.     Physical   Annual:     Getting at least 3 servings of Calcium per day:  Yes    Bi-annual eye exam:  Yes    Dental care twice a year:  Yes    Sleep apnea or symptoms of sleep apnea:  None    Diet:  Carbohydrate counting    Frequency of exercise:  6-7 days/week    Duration of exercise:  Greater than 60 minutes    Taking medications regularly:  Yes    Medication side effects:  None    Additional concerns today:  No    PHQ-2 Total Score: 0            Today's PHQ-2 Score:   PHQ-2 (  [employer]) 2018   Q1: Little interest or pleasure in doing things 0   Q2: Feeling down, depressed or hopeless 0   PHQ-2 Score 0   Q1: Little interest or pleasure in doing things Not at all   Q2: Feeling down, depressed or hopeless Not at all   PHQ-2 Score 0       Abuse: Current or Past(Physical, Sexual or Emotional)- No  Do you feel safe in your environment? Yes    Social History   Substance Use Topics     Smoking status: Former Smoker     Quit date: 2005     Smokeless tobacco: Never Used     Alcohol use Yes      Comment: occ     Alcohol Use 2018   If you drink alcohol do you typically have greater than 3 drinks per day OR greater than 7 drinks per week? No   No flowsheet data found.    Reviewed orders with patient.  Reviewed health maintenance and updated orders accordingly - Yes  Labs reviewed in EPIC  BP Readings from Last 3 Encounters:   18 110/72   18 108/72   18 110/73    Wt Readings from Last 3 Encounters:   18 107 lb (48.5 kg)   18 114 lb 13.8 oz (52.1 kg)   18 109 lb 9.6 oz (49.7 kg)                  Patient Active Problem List   Diagnosis     Headache     Amenorrhea, secondary     CARDIOVASCULAR SCREENING; LDL GOAL LESS THAN 160     Past Surgical History:   Procedure Laterality Date     C/SECTION, LOW TRANSVERSE      , Low Transverse     HC ENLARGE BREAST WITH  "IMPLANT  07/2017    saline     LASIK  06/01/2017     VAGINOPLASTY N/A 3/14/2018    Procedure: VAGINOPLASTY;  Revision Of Vaginal Scar Tissue ;  Surgeon: Trina Gaming MD;  Location:  OR       Social History   Substance Use Topics     Smoking status: Former Smoker     Quit date: 4/8/2005     Smokeless tobacco: Never Used     Alcohol use Yes      Comment: occ     Family History   Problem Relation Age of Onset     Allergies Mother      Ovarian Cancer Mother 62     YONATAN genetic carrier     Cancer - colorectal Maternal Grandfather      Cerebrovascular Disease Paternal Grandmother      HEART DISEASE Paternal Grandfather      older     Breast Cancer Maternal Aunt 72         Current Outpatient Prescriptions   Medication Sig Dispense Refill     NO ACTIVE MEDICATIONS .       Multiple Vitamins-Minerals (MULTIVITAMIN PO)        UNABLE TO FIND MEDICATION NAME: a drink with many vitamins       Allergies   Allergen Reactions     Morphine Hives     Recent Labs   Lab Test  08/30/17   0755  09/09/16   1417  10/04/13   0850   A1C  5.0  4.9   --    LDL  53  85   --    HDL  128  88   --    TRIG  50  102   --    TSH   --   2.14  2.05        Mammogram not appropriate for this patient based on age.    Pertinent mammograms are reviewed under the imaging tab.  History of abnormal Pap smear:   Last 3 Pap and HPV Results:   PAP / HPV 11/13/2015 10/8/2012 8/6/2010   PAP NIL NIL NIL     PAP / HPV 11/13/2015 10/8/2012 8/6/2010   PAP NIL NIL NIL       History of infrequent periods.  Periods are now regular.  Not using birth control. \"I need something.\"  Previously used the IUD.  Requesting nuvaring    Reviewed and updated as needed this visit by clinical staff  Tobacco  Allergies  Meds  Med Hx  Surg Hx  Fam Hx  Soc Hx        Reviewed and updated as needed this visit by Provider            Review of Systems   Constitutional: Negative for chills and fever.   HENT: Negative for congestion, ear pain and sore throat.    Eyes: Negative " "for pain and visual disturbance.   Respiratory: Negative for cough and shortness of breath.    Cardiovascular: Negative for chest pain, palpitations and peripheral edema.   Gastrointestinal: Negative for abdominal pain, constipation, diarrhea, heartburn, hematochezia and nausea.   Breasts:  Negative for tenderness, breast mass and discharge.   Genitourinary: Negative for dysuria, frequency, genital sores, hematuria, pelvic pain, urgency, vaginal bleeding and vaginal discharge.   Musculoskeletal: Negative for arthralgias, joint swelling and myalgias.   Skin: Negative for rash.   Neurological: Negative for dizziness, weakness, headaches and paresthesias.   Psychiatric/Behavioral: Negative for mood changes.          OBJECTIVE:   /72  Pulse 80  Temp 98  F (36.7  C)  Resp 16  Ht 5' 1\" (1.549 m)  Wt 107 lb (48.5 kg)  LMP 11/11/2018  SpO2 99%  Breastfeeding? No  BMI 20.22 kg/m2  Physical Exam  GENERAL: healthy, alert and no distress  EYES: Eyes grossly normal to inspection, PERRL and conjunctivae and sclerae normal  HENT: ear canals and TM's normal, nose and mouth without ulcers or lesions  NECK: no adenopathy, no asymmetry, masses, or scars and thyroid normal to palpation  RESP: lungs clear to auscultation - no rales, rhonchi or wheezes  BREAST: normal without masses, tenderness or nipple discharge and no palpable axillary masses or adenopathy  CV: regular rate and rhythm, normal S1 S2, no S3 or S4, no murmur, click or rub, no peripheral edema and peripheral pulses strong  ABDOMEN: soft, nontender, no hepatosplenomegaly, no masses and bowel sounds normal   (female): normal female external genitalia, normal urethral meatus, vaginal mucosa pink, moist, well rugated, and normal cervix/adnexa/uterus without masses or discharge  MS: no gross musculoskeletal defects noted, no edema  SKIN: no suspicious lesions or rashes  NEURO: Normal strength and tone, mentation intact and speech normal  PSYCH: mentation " "appears normal, affect normal/bright    Diagnostic Test Results:  Declined by patient today    ASSESSMENT/PLAN:   (Z00.00) Routine general medical examination at a health care facility  (primary encounter diagnosis)  Comment:   Plan:     (Z30.49) Encounter for initial management of nuvaring  Comment:   Plan: etonogestrel-ethinyl estradiol (NUVARING)         0.12-0.015 MG/24HR vaginal ring        Discussed proper use of the NuvaRing. Referred to [email or web address] for any questions. If any other questions, problems, or concerns, she may return to the clinic anytime. Pt agrees.  She will also plan to follow up with Dr. Gaming for permanent sterilization.     (Z12.4) Screening for malignant neoplasm of cervix  Comment:   Plan: Pap imaged thin layer screen with HPV -         recommended age 30 - 65 years (select HPV order        below)               COUNSELING:  Reviewed preventive health counseling, as reflected in patient instructions    BP Readings from Last 1 Encounters:   11/27/18 110/72     Estimated body mass index is 20.22 kg/(m^2) as calculated from the following:    Height as of this encounter: 5' 1\" (1.549 m).    Weight as of this encounter: 107 lb (48.5 kg).      Weight management plan noted, stable and monitoring     reports that she quit smoking about 13 years ago. She has never used smokeless tobacco.      Counseling Resources:  ATP IV Guidelines  Pooled Cohorts Equation Calculator  Breast Cancer Risk Calculator  FRAX Risk Assessment  ICSI Preventive Guidelines  Dietary Guidelines for Americans, 2010  USDA's MyPlate  ASA Prophylaxis  Lung CA Screening    CORINNE Howard CNP  [facility]  "

## 2018-11-30 LAB
COPATH REPORT: NORMAL
PAP: NORMAL

## 2018-12-03 LAB
FINAL DIAGNOSIS: NORMAL
HPV HR 12 DNA CVX QL NAA+PROBE: NEGATIVE
HPV16 DNA SPEC QL NAA+PROBE: NEGATIVE
HPV18 DNA SPEC QL NAA+PROBE: NEGATIVE
SPECIMEN DESCRIPTION: NORMAL
SPECIMEN SOURCE CVX/VAG CYTO: NORMAL

## 2019-01-19 ENCOUNTER — ANCILLARY PROCEDURE (OUTPATIENT)
Dept: ULTRASOUND IMAGING | Facility: CLINIC | Age: 39
End: 2019-01-19
Payer: COMMERCIAL

## 2019-01-19 ENCOUNTER — HOSPITAL ENCOUNTER (OUTPATIENT)
Facility: CLINIC | Age: 39
Setting detail: OBSERVATION
Discharge: HOME | End: 2019-01-20
Attending: EMERGENCY MEDICINE | Admitting: INTERNAL MEDICINE
Payer: COMMERCIAL

## 2019-01-19 ENCOUNTER — OFFICE VISIT (OUTPATIENT)
Dept: ORTHOPEDICS | Facility: CLINIC | Age: 39
End: 2019-01-19
Payer: COMMERCIAL

## 2019-01-19 ENCOUNTER — APPOINTMENT (OUTPATIENT)
Dept: CT IMAGING | Facility: CLINIC | Age: 39
End: 2019-01-19
Payer: COMMERCIAL

## 2019-01-19 VITALS — BODY MASS INDEX: 20.2 KG/M2 | HEIGHT: 61 IN | WEIGHT: 107 LBS | RESPIRATION RATE: 16 BRPM

## 2019-01-19 DIAGNOSIS — I82.492 ACUTE DEEP VEIN THROMBOSIS (DVT) OF OTHER SPECIFIED VEIN OF LEFT LOWER EXTREMITY (H): ICD-10-CM

## 2019-01-19 DIAGNOSIS — I26.99 OTHER ACUTE PULMONARY EMBOLISM WITHOUT ACUTE COR PULMONALE (H): ICD-10-CM

## 2019-01-19 DIAGNOSIS — M79.662 PAIN OF LEFT CALF: Primary | ICD-10-CM

## 2019-01-19 LAB
ALBUMIN SERPL-MCNC: 3.9 G/DL (ref 3.4–5)
ALBUMIN UR-MCNC: NEGATIVE MG/DL
ALP SERPL-CCNC: 75 U/L (ref 40–150)
ALT SERPL W P-5'-P-CCNC: 35 U/L (ref 0–50)
ANION GAP SERPL CALCULATED.3IONS-SCNC: 9 MMOL/L (ref 3–14)
APPEARANCE UR: CLEAR
AST SERPL W P-5'-P-CCNC: 44 U/L (ref 0–45)
BASOPHILS # BLD AUTO: 0 10E9/L (ref 0–0.2)
BASOPHILS NFR BLD AUTO: 0.5 %
BILIRUB DIRECT SERPL-MCNC: 0.1 MG/DL (ref 0–0.2)
BILIRUB SERPL-MCNC: 0.7 MG/DL (ref 0.2–1.3)
BILIRUB UR QL STRIP: NEGATIVE
BUN SERPL-MCNC: 35 MG/DL (ref 7–30)
CALCIUM SERPL-MCNC: 9.4 MG/DL (ref 8.5–10.1)
CHLORIDE SERPL-SCNC: 101 MMOL/L (ref 94–109)
CO2 SERPL-SCNC: 26 MMOL/L (ref 20–32)
COLOR UR AUTO: ABNORMAL
CREAT SERPL-MCNC: 0.57 MG/DL (ref 0.52–1.04)
DIFFERENTIAL METHOD BLD: ABNORMAL
EOSINOPHIL # BLD AUTO: 0.2 10E9/L (ref 0–0.7)
EOSINOPHIL NFR BLD AUTO: 3.2 %
ERYTHROCYTE [DISTWIDTH] IN BLOOD BY AUTOMATED COUNT: 12.3 % (ref 10–15)
GFR SERPL CREATININE-BSD FRML MDRD: >90 ML/MIN/{1.73_M2}
GLUCOSE SERPL-MCNC: 88 MG/DL (ref 70–99)
GLUCOSE UR STRIP-MCNC: NEGATIVE MG/DL
HCG UR QL: NEGATIVE
HCT VFR BLD AUTO: 36.3 % (ref 35–47)
HGB BLD-MCNC: 12.2 G/DL (ref 11.7–15.7)
HGB UR QL STRIP: ABNORMAL
IMM GRANULOCYTES # BLD: 0 10E9/L (ref 0–0.4)
IMM GRANULOCYTES NFR BLD: 0 %
KETONES UR STRIP-MCNC: NEGATIVE MG/DL
LEUKOCYTE ESTERASE UR QL STRIP: NEGATIVE
LYMPHOCYTES # BLD AUTO: 1.4 10E9/L (ref 0.8–5.3)
LYMPHOCYTES NFR BLD AUTO: 21.6 %
MCH RBC QN AUTO: 32.7 PG (ref 26.5–33)
MCHC RBC AUTO-ENTMCNC: 33.6 G/DL (ref 31.5–36.5)
MCV RBC AUTO: 97 FL (ref 78–100)
MONOCYTES # BLD AUTO: 0.6 10E9/L (ref 0–1.3)
MONOCYTES NFR BLD AUTO: 9.2 %
MUCOUS THREADS #/AREA URNS LPF: PRESENT /LPF
NEUTROPHILS # BLD AUTO: 4.4 10E9/L (ref 1.6–8.3)
NEUTROPHILS NFR BLD AUTO: 65.5 %
NITRATE UR QL: NEGATIVE
NRBC # BLD AUTO: 0 10*3/UL
NRBC BLD AUTO-RTO: 0 /100
PH UR STRIP: 5.5 PH (ref 5–7)
PLATELET # BLD AUTO: 170 10E9/L (ref 150–450)
POTASSIUM SERPL-SCNC: 3.9 MMOL/L (ref 3.4–5.3)
PROT SERPL-MCNC: 8.7 G/DL (ref 6.8–8.8)
RADIOLOGIST FLAGS: ABNORMAL
RADIOLOGIST FLAGS: ABNORMAL
RBC # BLD AUTO: 3.73 10E12/L (ref 3.8–5.2)
RBC #/AREA URNS AUTO: 1 /HPF (ref 0–2)
SODIUM SERPL-SCNC: 136 MMOL/L (ref 133–144)
SOURCE: ABNORMAL
SP GR UR STRIP: 1.02 (ref 1–1.03)
SQUAMOUS #/AREA URNS AUTO: <1 /HPF (ref 0–1)
TRANS CELLS #/AREA URNS HPF: <1 /HPF (ref 0–1)
UROBILINOGEN UR STRIP-MCNC: NORMAL MG/DL (ref 0–2)
WBC # BLD AUTO: 6.6 10E9/L (ref 4–11)
WBC #/AREA URNS AUTO: <1 /HPF (ref 0–5)

## 2019-01-19 PROCEDURE — 99222 1ST HOSP IP/OBS MODERATE 55: CPT | Mod: AI | Performed by: INTERNAL MEDICINE

## 2019-01-19 PROCEDURE — 25000132 ZZH RX MED GY IP 250 OP 250 PS 637: Performed by: EMERGENCY MEDICINE

## 2019-01-19 PROCEDURE — 80076 HEPATIC FUNCTION PANEL: CPT | Performed by: EMERGENCY MEDICINE

## 2019-01-19 PROCEDURE — G0378 HOSPITAL OBSERVATION PER HR: HCPCS

## 2019-01-19 PROCEDURE — 93010 ELECTROCARDIOGRAM REPORT: CPT | Mod: Z6 | Performed by: EMERGENCY MEDICINE

## 2019-01-19 PROCEDURE — 25000128 H RX IP 250 OP 636: Performed by: RADIOLOGY

## 2019-01-19 PROCEDURE — 81025 URINE PREGNANCY TEST: CPT | Performed by: EMERGENCY MEDICINE

## 2019-01-19 PROCEDURE — 12000001 ZZH R&B MED SURG/OB UMMC

## 2019-01-19 PROCEDURE — 99285 EMERGENCY DEPT VISIT HI MDM: CPT | Mod: 25 | Performed by: EMERGENCY MEDICINE

## 2019-01-19 PROCEDURE — 71260 CT THORAX DX C+: CPT

## 2019-01-19 PROCEDURE — 96372 THER/PROPH/DIAG INJ SC/IM: CPT | Mod: 59 | Performed by: EMERGENCY MEDICINE

## 2019-01-19 PROCEDURE — 81001 URINALYSIS AUTO W/SCOPE: CPT | Performed by: EMERGENCY MEDICINE

## 2019-01-19 PROCEDURE — 25000125 ZZHC RX 250: Performed by: RADIOLOGY

## 2019-01-19 PROCEDURE — 99207 ZZC APP CREDIT; MD BILLING SHARED VISIT: CPT | Performed by: PHYSICIAN ASSISTANT

## 2019-01-19 PROCEDURE — 85025 COMPLETE CBC W/AUTO DIFF WBC: CPT | Performed by: EMERGENCY MEDICINE

## 2019-01-19 PROCEDURE — 25000128 H RX IP 250 OP 636: Performed by: EMERGENCY MEDICINE

## 2019-01-19 PROCEDURE — 93005 ELECTROCARDIOGRAM TRACING: CPT | Performed by: EMERGENCY MEDICINE

## 2019-01-19 PROCEDURE — 80048 BASIC METABOLIC PNL TOTAL CA: CPT | Performed by: EMERGENCY MEDICINE

## 2019-01-19 RX ORDER — IOPAMIDOL 755 MG/ML
50 INJECTION, SOLUTION INTRAVASCULAR ONCE
Status: COMPLETED | OUTPATIENT
Start: 2019-01-19 | End: 2019-01-19

## 2019-01-19 RX ORDER — AMOXICILLIN 250 MG
2 CAPSULE ORAL 2 TIMES DAILY
Status: DISCONTINUED | OUTPATIENT
Start: 2019-01-19 | End: 2019-01-20 | Stop reason: HOSPADM

## 2019-01-19 RX ORDER — NALOXONE HYDROCHLORIDE 0.4 MG/ML
.1-.4 INJECTION, SOLUTION INTRAMUSCULAR; INTRAVENOUS; SUBCUTANEOUS
Status: DISCONTINUED | OUTPATIENT
Start: 2019-01-19 | End: 2019-01-20 | Stop reason: HOSPADM

## 2019-01-19 RX ORDER — ACETAMINOPHEN 650 MG/1
650 SUPPOSITORY RECTAL EVERY 4 HOURS PRN
Status: DISCONTINUED | OUTPATIENT
Start: 2019-01-19 | End: 2019-01-20 | Stop reason: HOSPADM

## 2019-01-19 RX ORDER — AMOXICILLIN 250 MG
1 CAPSULE ORAL 2 TIMES DAILY
Status: DISCONTINUED | OUTPATIENT
Start: 2019-01-19 | End: 2019-01-20 | Stop reason: HOSPADM

## 2019-01-19 RX ORDER — OXYCODONE HYDROCHLORIDE 5 MG/1
5 TABLET ORAL ONCE
Status: DISCONTINUED | OUTPATIENT
Start: 2019-01-19 | End: 2019-01-19

## 2019-01-19 RX ORDER — ONDANSETRON 2 MG/ML
4 INJECTION INTRAMUSCULAR; INTRAVENOUS EVERY 6 HOURS PRN
Status: DISCONTINUED | OUTPATIENT
Start: 2019-01-19 | End: 2019-01-20 | Stop reason: HOSPADM

## 2019-01-19 RX ORDER — POLYETHYLENE GLYCOL 3350 17 G/17G
17 POWDER, FOR SOLUTION ORAL DAILY PRN
Status: DISCONTINUED | OUTPATIENT
Start: 2019-01-19 | End: 2019-01-20 | Stop reason: HOSPADM

## 2019-01-19 RX ORDER — ACETAMINOPHEN 325 MG/1
650 TABLET ORAL EVERY 4 HOURS PRN
Status: DISCONTINUED | OUTPATIENT
Start: 2019-01-19 | End: 2019-01-20 | Stop reason: HOSPADM

## 2019-01-19 RX ORDER — ONDANSETRON 4 MG/1
4 TABLET, ORALLY DISINTEGRATING ORAL EVERY 6 HOURS PRN
Status: DISCONTINUED | OUTPATIENT
Start: 2019-01-19 | End: 2019-01-20 | Stop reason: HOSPADM

## 2019-01-19 RX ADMIN — ACETAMINOPHEN AND CODEINE PHOSPHATE 1 TABLET: 300; 30 TABLET ORAL at 19:48

## 2019-01-19 RX ADMIN — ENOXAPARIN SODIUM 50 MG: 60 INJECTION SUBCUTANEOUS at 19:42

## 2019-01-19 RX ADMIN — SODIUM CHLORIDE, PRESERVATIVE FREE 82 ML: 5 INJECTION INTRAVENOUS at 18:39

## 2019-01-19 RX ADMIN — IOPAMIDOL 50 ML: 755 INJECTION, SOLUTION INTRAVENOUS at 18:39

## 2019-01-19 ASSESSMENT — ENCOUNTER SYMPTOMS: SHORTNESS OF BREATH: 1

## 2019-01-19 ASSESSMENT — MIFFLIN-ST. JEOR: SCORE: 1102.73

## 2019-01-19 NOTE — PROGRESS NOTES
SPORTS & ORTHOPEDIC WALK-IN VISIT 1/19/2019    Primary Care Physician: Dr. Dempsey    Has been training for half marathon, normally a runner so not out of the ordinary. Had been running on the beach while on vacation and the day after she came home her calf started locking up which is painful. Middle of the day it's tolerable but much worse at night. Got a massage yesterday which felt better for a little while but last night was the worst she's had. Ibuprofen doesn't help much. Stabbing is right in center of calf between two muscles. Numbness/tingling goes all the way up side of leg and down to foot. Loss of dorsiflexion. No history of issues like this. Has a lot of business trips coming up.     Reason for visit:     What part of your body is injured / painful?  left calf    What caused the injury /pain? Unsure    How long ago did your injury occur or pain begin? a week ago    What are your most bothersome symptoms? Pain, Swelling, Numbness and Tingling    How would you characterize your symptom?  stabbing    What makes your symptoms better? Ice and Ibuprofen, moving leg back and forth    What makes your symptoms worse? Sitting for a long period of time    Have you been previously seen for this problem? No    Medical History:    Any recent changes to your medical history? No    Any new medication prescribed since last visit? No    Have you had surgery on this body part before? No    Social History:    Occupation: healthcare sales    Handedness: Right    Exercise: Most days/week    Review of Systems:    Do you have fever, chills, weight loss? No    Do you have any vision problems? No    Do you have any chest pain or edema? No    Do you have any shortness of breath or wheezing?  Yes, from pain     Do you have stomach problems? No    Do you have any numbness or focal weakness? Yes, in leg    Do you have diabetes? No    Do you have problems with bleeding or clotting? No    Do you have an rashes or other skin  lesions? No       HPI    Ms. Smith is a pleasant 38 year old year old female who presents to orthopedic walk in clinic today with concerns of acute onset left calf pain.  Krystyna explains that her symptoms began approximately 1 week ago.  She states prior to that time she had traveled to [facility] [facility] and has been spending a great deal of time running on the beach.  She states she typically runs at her baseline and runs half marathons regularly.  She does not typically run on the sand.  She denies any calf pain during her running or after stopping running.  She then took the [employer] back home and moved less than she typically does on the plane.  She did not have any immediate calf tenderness when she landed but later that evening when doing a cycling class she began to experience incredible calf pain and tightness.  Later that night she noticed calf swelling and is continued to endorse calf swelling since that time.  She has not noticed any overt redness or warmth but states she does have a notable suntan.  She denies any pain with plantarflexion or dorsiflexion or any overt injuries for the cause of her pain.  She takes estrogen birth control.  She is a non-smoker.  No history of DVT.  She does endorse some numbness and tingling into her leg.  She states she believes her pain is worsening and also occurring at rest causing her to not sleep well last night.  She characterizes the pain as stabbing in nature.  Overall, she feels the swelling is improved today.  No shortness of breath or chest pain.      Medical History     Past Surgical History:   Procedure Laterality Date     C/SECTION, LOW TRANSVERSE      , Low Transverse     HC ENLARGE BREAST WITH IMPLANT  2017    saline     LASIK  2017     VAGINOPLASTY N/A 3/14/2018    Procedure: VAGINOPLASTY;  Revision Of Vaginal Scar Tissue ;  Surgeon: Trina Gaming MD;  Location: [facility] OR       Past Medical History:   Diagnosis Date     Headache  "2010     Problem list name updated by automated process. Provider to review     NO ACTIVE PROBLEMS        Allergies   Allergen Reactions     Morphine Hives       Social History     Socioeconomic History     Marital status:      Spouse name: Not on file     Number of children: 1     Years of education: Not on file     Highest education level: Not on file   Social Needs     Financial resource strain: Not on file     Food insecurity - worry: Not on file     Food insecurity - inability: Not on file     Transportation needs - medical: Not on file     Transportation needs - non-medical: Not on file   Occupational History     Occupation: [employer], 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 Not Asked   Social History Narrative     Not on file       Family History   Problem Relation Age of Onset     Allergies Mother      Ovarian Cancer Mother 62        YONATAN genetic carrier     Cancer - colorectal Maternal Grandfather      Cerebrovascular Disease Paternal Grandmother      Heart Disease Paternal Grandfather         older     Breast Cancer Maternal Aunt 72       Current Outpatient Medications   Medication     etonogestrel-ethinyl estradiol (NUVARING) 0.12-0.015 MG/24HR vaginal ring     Multiple Vitamins-Minerals (MULTIVITAMIN PO)     UNABLE TO FIND     No current facility-administered medications for this visit.            Objective Findings     Vitals:    19 1414   Resp: 16   Weight: 48.5 kg (107 lb)   Height: 1.549 m (5' 1\")         Physical Exam:  Gen: A&O in NAD   CV: " extremities well perfused x4  Pulm: without respiratory distress  Derm: no rashes or lesions  Psych: normal affect and speech  Gait: Favoring left leg    MSK:   -Left leg: Left gastroc is diffusely tender to deep palpation.  It is not warm or red but there is mild to moderate swelling.  Plantarflexion and dorsiflexion of the foot as well as against resistance does not reproduce the patient's pain.  There is no palpable defect about the gastroc or Achilles.  She is nontender over the Achilles or retrocalcaneal bursa.  She does have some tenderness palpation over the mid medial tibia.    Assessment / Plan     #) Left calf pain and swelling    -Findings reviewed with the patient as above  -Recommended ultrasound of the left lower extremity to eval for DVT  -Unfortunately venous ultrasound shows evidence of deep vein thrombosis in the popliteal and distal femoral veins  -Patient was informed of these findings and recommendations for immediate further evaluation and treatment in an ER  -Patient was offered emergency room of her choice and she elected to proceed to [facility] ER  -Emergency room physician was contacted and agreed to receive patient  -Formal transport was recommend which patient refused and will arrive to ER via personal vehicle    Patient endorsed understanding and agreement with the above plan    Aniket Carney MD  Primary Care Sports Medicine, [facility]

## 2019-01-19 NOTE — LETTER
1/19/2019       RE: Krystyna Smith  [address]  [place] [address]     Dear Colleague,    Thank you for referring your patient, Krystyna Smith, to the [facility] SPORTS AND ORTHOPAEDIC WALK IN CLINIC at [facility]. Please see a copy of my visit note below.          SPORTS & ORTHOPEDIC WALK-IN VISIT 1/19/2019    Primary Care Physician: Dr. Dempsey    Has been training for half marathon, normally a runner so not out of the ordinary. Had been running on the beach while on vacation and the day after she came home her calf started locking up which is painful. Middle of the day it's tolerable but much worse at night. Got a massage yesterday which felt better for a little while but last night was the worst she's had. Ibuprofen doesn't help much. Stabbing is right in center of calf between two muscles. Numbness/tingling goes all the way up side of leg and down to foot. Loss of dorsiflexion. No history of issues like this. Has a lot of business trips coming up.     Reason for visit:     What part of your body is injured / painful?  left calf    What caused the injury /pain? Unsure    How long ago did your injury occur or pain begin? a week ago    What are your most bothersome symptoms? Pain, Swelling, Numbness and Tingling    How would you characterize your symptom?  stabbing    What makes your symptoms better? Ice and Ibuprofen, moving leg back and forth    What makes your symptoms worse? Sitting for a long period of time    Have you been previously seen for this problem? No    Medical History:    Any recent changes to your medical history? No    Any new medication prescribed since last visit? No    Have you had surgery on this body part before? No    Social History:    Occupation: healthcare sales    Handedness: Right    Exercise: Most days/week    Review of Systems:    Do you have fever, chills, weight loss? No    Do you have any vision problems? No    Do you have  any chest pain or edema? No    Do you have any shortness of breath or wheezing?  Yes, from pain     Do you have stomach problems? No    Do you have any numbness or focal weakness? Yes, in leg    Do you have diabetes? No    Do you have problems with bleeding or clotting? No    Do you have an rashes or other skin lesions? No       HPI    Ms. Smith is a pleasant 38 year old year old female who presents to orthopedic walk in clinic today with concerns of acute onset left calf pain.  Krystyna explains that her symptoms began approximately 1 week ago.  She states prior to that time she had traveled to [facility] and has been spending a great deal of time running on the beach.  She states she typically runs at her baseline and runs half marathons regularly.  She does not typically run on the sand.  She denies any calf pain during her running or after stopping running.  She then took the [email or web address] flight back home and moved less than she typically does on the plane.  She did not have any immediate calf tenderness when she landed but later that evening when doing a cycling class she began to experience incredible calf pain and tightness.  Later that night she noticed calf swelling and is continued to endorse calf swelling since that time.  She has not noticed any overt redness or warmth but states she does have a notable suntan.  She denies any pain with plantarflexion or dorsiflexion or any overt injuries for the cause of her pain.  She takes estrogen birth control.  She is a non-smoker.  No history of DVT.  She does endorse some numbness and tingling into her leg.  She states she believes her pain is worsening and also occurring at rest causing her to not sleep well last night.  She characterizes the pain as stabbing in nature.  Overall, she feels the swelling is improved today.  No shortness of breath or chest pain.      Medical History     Past Surgical History:   Procedure Laterality Date     C/SECTION, LOW TRANSVERSE       , Low Transverse     HC ENLARGE BREAST WITH IMPLANT  2017    saline     LASIK  2017     VAGINOPLASTY N/A 3/14/2018    Procedure: VAGINOPLASTY;  Revision Of Vaginal Scar Tissue ;  Surgeon: Trina Gaming MD;  Location: [facility] OR       Past Medical History:   Diagnosis Date     Headache 2010     Problem list name updated by automated process. 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 Not Asked   Social History Narrative     Not on file       Family History   Problem Relation Age of Onset     Allergies Mother      Ovarian Cancer Mother 62        YONATAN genetic carrier     Cancer - colorectal Maternal Grandfather      Cerebrovascular Disease Paternal Grandmother      Heart Disease Paternal Grandfather         older     Breast Cancer Maternal Aunt 72       Current Outpatient Medications   Medication     etonogestrel-ethinyl estradiol (NUVARING) 0.12-0.015 MG/24HR vaginal  "ring     Multiple Vitamins-Minerals (MULTIVITAMIN PO)     UNABLE TO FIND     No current facility-administered medications for this visit.            Objective Findings     Vitals:    01/19/19 1414   Resp: 16   Weight: 48.5 kg (107 lb)   Height: 1.549 m (5' 1\")         Physical Exam:  Gen: A&O in NAD   CV: extremities well perfused x4  Pulm: without respiratory distress  Derm: no rashes or lesions  Psych: normal affect and speech  Gait: Favoring left leg    MSK:   -Left leg: Left gastroc is diffusely tender to deep palpation.  It is not warm or red but there is mild to moderate swelling.  Plantarflexion and dorsiflexion of the foot as well as against resistance does not reproduce the patient's pain.  There is no palpable defect about the gastroc or Achilles.  She is nontender over the Achilles or retrocalcaneal bursa.  She does have some tenderness palpation over the mid medial tibia.    Assessment / Plan     #) Left calf pain and swelling    -Findings reviewed with the patient as above  -Recommended ultrasound of the left lower extremity to eval for DVT  -Unfortunately venous ultrasound shows evidence of deep vein thrombosis in the popliteal and distal femoral veins  -Patient was informed of these findings and recommendations for immediate further evaluation and treatment in an ER  -Patient was offered emergency room of her choice and she elected to proceed to [facility] ER  -Emergency room physician was contacted and agreed to receive patient  -Formal transport was recommend which patient refused and will arrive to ER via personal vehicle    Patient endorsed understanding and agreement with the above plan    Aniket Carney MD  Primary Care Sports Medicine, [facility]        "

## 2019-01-19 NOTE — ED TRIAGE NOTES
Pt has been having progressively worsening pain from the left knee down the calf for the past 8 days. Pt went to clinic today and rec'd an US confirming a DVT behind the left knee.

## 2019-01-19 NOTE — ED PROVIDER NOTES
[facility] EMERGENCY DEPARTMENT ([facility])    1/19/19     ED 11   5:13 PM   History     Chief Complaint   Patient presents with     Leg Pain      DVT in left leg     The history is provided by the patient and medical records.     Krystyna Smith is a 38 year old female who presents with left calf pain as well as shortness of breath for the past 3 days. Patient has been training for a half marathon and had done a lot of running while on vacation in [place]. Her return flight had been delayed and she had sat a while waiting for it. The flight was 5.5 hours. The day after she had returned from vacation in [place] she noticed increased left calf pain and swelling. She saw a massage therapist for this without improvement. Patient then presented to sports medicine clinic today complaining of this worsening left calf pain and swelling. She has not been able to sleep due to pain.  Patient was seen by Dr. Carney who noted her unilateral leg swelling in setting of recent travel.  Outpatient venous ultrasound was done showing DVT.  He sent her to the emergency department for further evaluation of and management of left posterior popliteal DVT. She continues to have left leg pain and asks about pain management. Ibuprofen does help but she doesn't like taking it.  No chest pain but she did have some shortness of breath this week. She thought it was her hyperventilating but feels as if she can't catch a full breath. It's pronounced when she gets up in the mornings. She was gasping for breath trying to ascend stairs and this is atypical for her given that she is quite active at baseline. No pain with deep inspiration. She states the marathon next Saturday in [place]. She travels often for work (she is a ) and asks if she can fly if she has a DVT. She is healthy at baseline, no other medical issues. Patient is on a new birth control, Nuvaring, that she started in December. She  denies chance of pregnancy. She is a non-smoker.  No prior history of DVT.    I have reviewed the Medications, Allergies, Past Medical and Surgical History, and Social History in the [facility] system.  PAST MEDICAL HISTORY:   Past Medical History:   Diagnosis Date     Headache 2010     Problem list name updated by automated process. Provider to review     NO ACTIVE PROBLEMS        PAST SURGICAL HISTORY:   Past Surgical History:   Procedure Laterality Date     C/SECTION, LOW TRANSVERSE      , Low Transverse     HC ENLARGE BREAST WITH IMPLANT  2017    saline     LASIK  2017     VAGINOPLASTY N/A 3/14/2018    Procedure: VAGINOPLASTY;  Revision Of Vaginal Scar Tissue ;  Surgeon: Trina Gaming MD;  Location: [facility] OR       FAMILY HISTORY:   Family History   Problem Relation Age of Onset     Allergies Mother      Ovarian Cancer Mother 62        YONATAN genetic carrier     Cancer - colorectal Maternal Grandfather      Cerebrovascular Disease Paternal Grandmother      Heart Disease Paternal Grandfather         older     Breast Cancer Maternal Aunt 72       SOCIAL HISTORY:   Social History     Tobacco Use     Smoking status: Former Smoker     Last attempt to quit: 2005     Years since quittin.7     Smokeless tobacco: Never Used   Substance Use Topics     Alcohol use: Yes     Comment: occ          Medication List      ASK your doctor about these medications    etonogestrel-ethinyl estradiol 0.12-0.015 MG/24HR vaginal ring  Commonly known as:  NUVARING  Insert 1 ring vaginally every 21 days then remove for 1 week then repeat with new ring.     MULTIVITAMIN PO     UNABLE TO FIND               Allergies   Allergen Reactions     Morphine Hives      Review of Systems   Respiratory: Positive for shortness of breath.    Cardiovascular: Positive for leg swelling.    ROS: 10 point ROS neg other than the symptoms noted above or in the HPI.      Physical Exam   BP: 132/78  Heart Rate: 58  Temp: 97.7  F (36.5   C)  Resp: 16  Weight: 50 kg (110 lb 4.8 oz)  SpO2: 98 %      Physical Exam  GEN: Well appearing, non toxic, cooperative.   HEENT: The head is normocephalic and atraumatic. Pupils are equal round and reactive to light. Extraocular motions are intact. There is no facial swelling. The neck is nontender and supple.   CV: Regular rate and rhythm without murmurs rubs or gallops. 2+ radial pulses bilaterally.  PULM: Clear to auscultation bilaterally.  ABD: Soft, nontender, nondistended.   EXT: Full range of motion.  No edema. Mild tenderness to palpation in L calf, no swelling noted.   NEURO: Cranial nerves II through XII are intact and symmetric. Bilateral upper and lower extremities grossly show full range of motion without any focal deficits.   SKIN: No rashes, ecchymosis, or lacerations  PSYCH: Calm and cooperative, interactive.      ED Course        Procedures             EKG Interpretation:      Interpreted by Yuniel Glynn  Time reviewed: 1743   Symptoms at time of EKG: SOB   Rhythm: normal sinus   Rate: normal  Axis: normal  Ectopy: none  Conduction: normal  ST Segments/ T Waves: No ST-T wave changes  Q Waves: none  Comparison to prior: No old EKG available    Clinical Impression: normal EKG          Critical Care time:  none         Results for orders placed or performed during the hospital encounter of 01/19/19   Chest CT, IV contrast only - PE protocol   Result Value Ref Range    Radiologist flags Bilateral pulmonary emboli (Urgent)     Addendum: 1/19/2019    Three-dimensional (3D) post-processed angiographic images were  reconstructed, archived to PACS and used in the interpretation of this  study.    DARYL KIRBY MD      Narrative    Examination: CT CHEST PULMONARY EMBOLISM W CONTRAST, 1/19/2019 6:51 PM    Comparison: Same-day ultrasound.    History: Shortness of breath; diagnosed with DVT today but reporting  SOB.    Technique: Axial thin slice images from the lung apices to the  diaphragm were obtained  following the injection of intravenous  contrast media in the pulmonary arterial phase.     Contrast dose: 50cc of Isovue 370    Radiation Dose (DLP): 171 mGy*cm    Findings:   There is adequate contrast bolus in the study. There are bilateral  pulmonary emboli.   On the right there is a nonocclusive filling  defect in the peripheral right main pulmonary artery. There are  additional predominantly nonocclusive thrombi in the right upper,  middle, and lower lobar arteries with extension into the segmental and  subsegmental pulmonary arterial branches.  On the left there are  predominantly nonocclusive pulmonary emboli in the segmental and  subsegmental pulmonary arteries to the left upper and left lower lobar  pulmonary arteries.    .No evidence of right heart strain. No cardiac thrombus. Main  pulmonary artery is not dilated. Aorta and its major branches appear  patent. Normal caliber of the thoracic great vessels. Normal variant  common origin of the left common carotid and innominate artery.    The central tracheobronchial tree is patent. Few patchy subpleural  foci of groundglass attenuation in the left apex, right upper lobe,  right lower lobe. No pneumothorax or pleural effusion. Few  subcentimeter pulmonary nodules, representative nodule along the left  major fissure measures 4 mm (series 6 image 130). Homogeneous thyroid  appearance.    The heart size is normal. No pericardial effusion. No thoracic  adenopathy.     Limited views of the abdomen reveal no acute pathology.     Mild degenerative changes of the spine. Sclerotic inferior endplate of  T11, likely degenerative. No suspicious osseous or soft tissue  lesions. Postsurgical changes of bilateral retropectoral breast  implants.      Impression    IMPRESSION:   1. Bilateral pulmonary pulmonary emboli with clot burden slightly  greater on the right. No evidence of right heart strain. Few  subpleural patchy foci of groundglass attenuation, may represent  small  pulmonary infarcts versus atelectasis.  2. Few subcentimeter pulmonary nodules, largest along the left major  fissure measures 4 mm. Given small size, no dedicated follow-up is  necessary, unless the patient is considered at high risk for  developing lung cancer in which case follow-up CT in 12 months is  recommended.    [Urgent Result: Bilateral pulmonary emboli]    Finding was identified on 1/19/2019 7:05 PM.     Dr. Yuniel Glynn was contacted by Dr. Nash Lisa at 1/19/2019 7:08  PM and verbalized understanding of the urgent finding.     I have personally reviewed the examination and initial interpretation  and I agree with the findings.    DARYL KIRBY MD   Basic metabolic panel   Result Value Ref Range    Sodium 136 133 - 144 mmol/L    Potassium 3.9 3.4 - 5.3 mmol/L    Chloride 101 94 - 109 mmol/L    Carbon Dioxide 26 20 - 32 mmol/L    Anion Gap 9 3 - 14 mmol/L    Glucose 88 70 - 99 mg/dL    Urea Nitrogen 35 (H) 7 - 30 mg/dL    Creatinine 0.57 0.52 - 1.04 mg/dL    GFR Estimate >90 >60 mL/min/[1.73_m2]    GFR Estimate If Black >90 >60 mL/min/[1.73_m2]    Calcium 9.4 8.5 - 10.1 mg/dL   HCG qualitative urine   Result Value Ref Range    HCG Qual Urine Negative NEG^Negative   UA with Microscopic   Result Value Ref Range    Color Urine Light Yellow     Appearance Urine Clear     Glucose Urine Negative NEG^Negative mg/dL    Bilirubin Urine Negative NEG^Negative    Ketones Urine Negative NEG^Negative mg/dL    Specific Gravity Urine 1.019 1.003 - 1.035    Blood Urine Small (A) NEG^Negative    pH Urine 5.5 5.0 - 7.0 pH    Protein Albumin Urine Negative NEG^Negative mg/dL    Urobilinogen mg/dL Normal 0.0 - 2.0 mg/dL    Nitrite Urine Negative NEG^Negative    Leukocyte Esterase Urine Negative NEG^Negative    Source Midstream Urine     WBC Urine <1 0 - 5 /HPF    RBC Urine 1 0 - 2 /HPF    Squamous Epithelial /HPF Urine <1 0 - 1 /HPF    Transitional Epi <1 0 - 1 /HPF    Mucous Urine Present (A) NEG^Negative /LPF    CBC with platelets differential   Result Value Ref Range    WBC 6.6 4.0 - 11.0 10e9/L    RBC Count 3.73 (L) 3.8 - 5.2 10e12/L    Hemoglobin 12.2 11.7 - 15.7 g/dL    Hematocrit 36.3 35.0 - 47.0 %    MCV 97 78 - 100 fl    MCH 32.7 26.5 - 33.0 pg    MCHC 33.6 31.5 - 36.5 g/dL    RDW 12.3 10.0 - 15.0 %    Platelet Count 170 150 - 450 10e9/L    Diff Method Automated Method     % Neutrophils 65.5 %    % Lymphocytes 21.6 %    % Monocytes 9.2 %    % Eosinophils 3.2 %    % Basophils 0.5 %    % Immature Granulocytes 0.0 %    Nucleated RBCs 0 0 /100    Absolute Neutrophil 4.4 1.6 - 8.3 10e9/L    Absolute Lymphocytes 1.4 0.8 - 5.3 10e9/L    Absolute Monocytes 0.6 0.0 - 1.3 10e9/L    Absolute Eosinophils 0.2 0.0 - 0.7 10e9/L    Absolute Basophils 0.0 0.0 - 0.2 10e9/L    Abs Immature Granulocytes 0.0 0 - 0.4 10e9/L    Absolute Nucleated RBC 0.0    EKG 12-lead, tracing only   Result Value Ref Range    Interpretation ECG Click View Image link to view waveform and result             Labs Ordered and Resulted from Time of ED Arrival Up to the Time of Departure from the ED   BASIC METABOLIC PANEL - Abnormal; Notable for the following components:       Result Value    Urea Nitrogen 35 (*)     All other components within normal limits   ROUTINE UA WITH MICROSCOPIC - Abnormal; Notable for the following components:    Blood Urine Small (*)     Mucous Urine Present (*)     All other components within normal limits   CBC WITH PLATELETS DIFFERENTIAL - Abnormal; Notable for the following components:    RBC Count 3.73 (*)     All other components within normal limits   HCG QUALITATIVE URINE   HEPATIC PANEL   PERIPHERAL IV CATHETER            Assessments & Plan (with Medical Decision Making)   Patient is a previously healthy 38-year-old female who presents from sports medicine clinic with left calf pain.  At clinic she was found to have a nearly occlusive DVT.  Upon arrival to the emergency department patient states she is also had shortness  of breath and difficulty catching in the entire breath.  Initial vitals were within normal limits.  Notable for left calf tenderness.  Basic labs were obtained and within normal limits.  Urine pregnancy test was negative.  Given her dyspnea, CT pulmonary for PE was obtained and concerning for bilateral pulmonary embolism worse on the right side.  Patient was given Lovenox while in the emergency department as well as pain control for her calf pain.  Given her severe clot burden, will admit to medicine for further cares and evaluation.  Patient remained hemodynamically stable during her entire stay in the emergency department and was admitted to the medicine floor.    I have reviewed the nursing notes.         Medication List      There are no discharge medications for this visit.         Final diagnoses:   Other acute pulmonary embolism without acute cor pulmonale (H)   Acute deep vein thrombosis (DVT) of other specified vein of left lower extremity (H)     IZoe, am serving as a trained medical scribe to document services personally performed by Yuniel Glynn MD based on the provider's statements to me on January 19, 2019.  This document has been checked and approved by the attending provider.    IYuniel MD, was physically present and have reviewed and verified the accuracy of this note documented by Zoe Davidson medical scribe.     1/19/2019   [facility], [place], EMERGENCY DEPARTMENT     Yuniel Glynn MD  01/19/19 2021

## 2019-01-19 NOTE — ED NOTES
Bed: IN05  Expected date:   Expected time:   Means of arrival:   Comments:  Krystyna Smith [birth year]  ALMAE DVT

## 2019-01-20 ENCOUNTER — PATIENT OUTREACH (OUTPATIENT)
Dept: CARE COORDINATION | Facility: CLINIC | Age: 39
End: 2019-01-20

## 2019-01-20 VITALS
DIASTOLIC BLOOD PRESSURE: 78 MMHG | OXYGEN SATURATION: 99 % | SYSTOLIC BLOOD PRESSURE: 116 MMHG | RESPIRATION RATE: 16 BRPM | WEIGHT: 110.2 LBS | BODY MASS INDEX: 20.82 KG/M2 | HEART RATE: 52 BPM | TEMPERATURE: 98.6 F

## 2019-01-20 LAB
ANION GAP SERPL CALCULATED.3IONS-SCNC: 8 MMOL/L (ref 3–14)
BUN SERPL-MCNC: 18 MG/DL (ref 7–30)
CALCIUM SERPL-MCNC: 8.7 MG/DL (ref 8.5–10.1)
CHLORIDE SERPL-SCNC: 106 MMOL/L (ref 94–109)
CO2 SERPL-SCNC: 25 MMOL/L (ref 20–32)
CREAT SERPL-MCNC: 0.6 MG/DL (ref 0.52–1.04)
ERYTHROCYTE [DISTWIDTH] IN BLOOD BY AUTOMATED COUNT: 12.5 % (ref 10–15)
GFR SERPL CREATININE-BSD FRML MDRD: >90 ML/MIN/{1.73_M2}
GLUCOSE SERPL-MCNC: 73 MG/DL (ref 70–99)
HCT VFR BLD AUTO: 39.6 % (ref 35–47)
HGB BLD-MCNC: 13.1 G/DL (ref 11.7–15.7)
INR PPP: 1.03 (ref 0.86–1.14)
INTERPRETATION ECG - MUSE: NORMAL
MCH RBC QN AUTO: 32.5 PG (ref 26.5–33)
MCHC RBC AUTO-ENTMCNC: 33.1 G/DL (ref 31.5–36.5)
MCV RBC AUTO: 98 FL (ref 78–100)
PLATELET # BLD AUTO: 201 10E9/L (ref 150–450)
POTASSIUM SERPL-SCNC: 3.9 MMOL/L (ref 3.4–5.3)
RBC # BLD AUTO: 4.03 10E12/L (ref 3.8–5.2)
SODIUM SERPL-SCNC: 138 MMOL/L (ref 133–144)
WBC # BLD AUTO: 6.6 10E9/L (ref 4–11)

## 2019-01-20 PROCEDURE — G0378 HOSPITAL OBSERVATION PER HR: HCPCS

## 2019-01-20 PROCEDURE — 85610 PROTHROMBIN TIME: CPT | Performed by: PHYSICIAN ASSISTANT

## 2019-01-20 PROCEDURE — 80048 BASIC METABOLIC PNL TOTAL CA: CPT | Performed by: PHYSICIAN ASSISTANT

## 2019-01-20 PROCEDURE — 25000132 ZZH RX MED GY IP 250 OP 250 PS 637: Performed by: STUDENT IN AN ORGANIZED HEALTH CARE EDUCATION/TRAINING PROGRAM

## 2019-01-20 PROCEDURE — 85027 COMPLETE CBC AUTOMATED: CPT | Performed by: PHYSICIAN ASSISTANT

## 2019-01-20 PROCEDURE — 99239 HOSP IP/OBS DSCHRG MGMT >30: CPT | Performed by: STUDENT IN AN ORGANIZED HEALTH CARE EDUCATION/TRAINING PROGRAM

## 2019-01-20 PROCEDURE — 25000132 ZZH RX MED GY IP 250 OP 250 PS 637: Performed by: PHYSICIAN ASSISTANT

## 2019-01-20 PROCEDURE — 36415 COLL VENOUS BLD VENIPUNCTURE: CPT | Performed by: PHYSICIAN ASSISTANT

## 2019-01-20 RX ORDER — POLYETHYLENE GLYCOL 3350 17 G/17G
17 POWDER, FOR SOLUTION ORAL DAILY PRN
Start: 2019-01-20 | End: 2019-04-23

## 2019-01-20 RX ORDER — ACETAMINOPHEN 325 MG/1
650 TABLET ORAL EVERY 6 HOURS PRN
Start: 2019-01-20 | End: 2019-04-23

## 2019-01-20 RX ADMIN — RIVAROXABAN 15 MG: 15 TABLET, FILM COATED ORAL at 10:23

## 2019-01-20 RX ADMIN — ACETAMINOPHEN AND CODEINE PHOSPHATE 1 TABLET: 300; 30 TABLET ORAL at 03:22

## 2019-01-20 RX ADMIN — ACETAMINOPHEN AND CODEINE PHOSPHATE 1 TABLET: 300; 30 TABLET ORAL at 09:26

## 2019-01-20 ASSESSMENT — ACTIVITIES OF DAILY LIVING (ADL)
ADLS_ACUITY_SCORE: 10
ADLS_ACUITY_SCORE: 12
ADLS_ACUITY_SCORE: 10

## 2019-01-20 NOTE — PLAN OF CARE
Adult Inpatient Plan of Care  Plan of Care Review  1/20/2019 1126 - Adequate for Discharge by Qing Rodriguez, RN   Patient discharged to home. PIV removed. Belongings packed by patient. Appropriate discharge paperwork reviewed. Patient up ad lara. Left unit approx 1125.   
"Pt arrived to unit from ED at 2249 with spouse and son. Alert and oriented. Up ad lara. PIV SL. Admitted with c/o of LLE pain, dx of LLE DVT, and bilateral lung PEs. States that pain located on LLE is \"getting much better.\" Left LE slightly edematous and warm. Lungs clear, however bases diminished. Denies sob. Started on telemetry. Will continue to monitor.  "
"Status update: Got up once during the night and states that she began feeling \"pretty bad pain\" on LLE; requested tylenol 3 given and pt was able to fall back asleep. Telemetry running sinus bradycardia, pt states that this is her baseline. Urine sample pending collection. Continue to monitor and implement poc.  "
Declined

## 2019-01-20 NOTE — DISCHARGE INSTRUCTIONS
Dear Ms. [place]: You were prescribed rivaroxaban (Xarelto) for anticoagulation to treat both a pulmonary embolus (PE) and deep venous thrombosis (DVT). You should take as prescribed, which involves taking 15 milligrams orally twice daily for 21 days, and then taking 20 milligrams once daily for the remainder of your treatment.    Please arrange a follow up appointment with your primary care physician and your obstetrician/gynecologist. They can assist both in monitoring your treatment progress and discussing potential options for contraception moving forward. In the meantime, please utilize barrier contraception (for example, condoms) instead of hormonal contraception to prevent pregnancy while taking rivaroxaban.

## 2019-01-20 NOTE — DISCHARGE SUMMARY
[facility], [place]  Hospitalist Discharge Summary       Date of Admission:  1/19/2019  Date of Discharge:  1/20/2019  Discharging Provider: Rod Lucia MD  Discharge Team: Hospitalist Service, Gold 9    Discharge Diagnoses   1. Acute nilateral lobar, segmental and subsegmental pulmonary embolus without hemodynamic compromise  2. Acute left lower extremity deep venous thrombosis    Follow-ups Needed After Discharge   Follow-up Appointments     Adult [facility] Follow-up and recommended labs and tests      Follow up with primary care provider, Mehreen Dempsey, within 7 days for hospital follow- up.  No follow up labs or test are needed.      Appointments on [place] and/or [facility] (with [facility] or [facility] provider or service). Call [phone] if you haven't heard regarding these appointments within 7 days of discharge.               Unresulted Labs Ordered in the Past 30 Days of this Admission     No orders found for last 61 day(s).          Hospital Course     Krystyna Smith is a 38 year old female admitted on 1/19/2019 for management of LLE DVT and bilateral PE.    #Acute Bilateral Lobar, Segmental and Subsegmental PE  #Acute LLE DVT  Ms. Smith presented with approximately 1 week of left calf pain/swelling and the onset of dyspnea on exertion and palpitations since 1/17/19. She was referred to the ED after outpatient confirmation of DVT with lower extremity US. CT with PE protocol bilateral pulmonary emboli with clot burden higher on the right, and without signs of right heart strain. EKG was reassuring. The patient was admitted for monitoring and management after receiving a treatment dose of enoxaparin. She remained stable in room air without signs of hemodynamic compromise. She was transitioned to course of rivaroxaban (loading dosing strategy followed by standard therapy) with plan for treatment for 3 months for provoked DVT in the setting of a recent long  flight (from [place]) and initiation of hormonal contraception. Hormonal contraception (Nuvaring) was discontinued with recommendation for barrier contraception until follow up with her primary care physician/OBGYN for discussion of further, non-systemic hormonal, options.     Pain in her LLE was treated with Tylenol with codeine, and she was given a two day prescription for use at discharge.    Prior to discharge, warning signs and reasons to seek urgent medical attention were discussed with the patient.    Consultations This Hospital Stay   None    Code Status   Full Code    Time Spent on this Encounter   I, Rod Lucia, personally saw the patient today and spent greater than 30 minutes discharging this patient.       Rod Lucia MD  [facility], [place]  ______________________________________________________________________    Physical Exam   Vital Signs: Temp: 98.6  F (37  C) Temp src: Oral BP: 116/78 Pulse: 52 Heart Rate: 55 Resp: 16 SpO2: 99 % O2 Device: None (Room air)    Weight: 110 lbs 3.2 oz  GEN: NAD, pleasant and interactive, answers questions appropriately.  CV: RRR, no appreciable MRG  PULM: CTAB, no signs of respiratory distress  ABD: Soft, nondistended  EXT: Mild swelling of the left calf, with tenderness to palpation posteriorly and some engorgement of superficial vasculature. Bilateral pedal pulses appreciable and feet are WWP.        Primary Care Physician   Mehreen Dempsey    Discharge Disposition   Discharged to home  Condition at discharge: Stable    Significant Results and Procedures   Most Recent 3 CBC's:  Recent Labs   Lab Test 01/20/19  0604 01/19/19  1904 02/25/18  1232   WBC 6.6 6.6 4.5   HGB 13.1 12.2 12.7   MCV 98 97 97    170 210     Most Recent 3 BMP's:  Recent Labs   Lab Test 01/20/19  0604 01/19/19  1716 08/30/17  0755    136  --    POTASSIUM 3.9 3.9  --    CHLORIDE 106 101  --    CO2 25 26  --    BUN 18 35*  --     CR 0.60 0.57  --    ANIONGAP 8 9  --    RANDY 8.7 9.4  --    GLC 73 88 85     Most Recent 2 LFT's:  Recent Labs   Lab Test 01/19/19  1716   AST 44   ALT 35   ALKPHOS 75   BILITOTAL 0.7     Most Recent 3 INR's:  Recent Labs   Lab Test 01/20/19  0604   INR 1.03       Urine HCG 1/19/18: Negative    Results for orders placed or performed during the hospital encounter of 01/19/19   Chest CT, IV contrast only - PE protocol     Value    Radiologist flags Bilateral pulmonary emboli (Urgent)    Addendum: 1/19/2019    Three-dimensional (3D) post-processed angiographic images were  reconstructed, archived to PACS and used in the interpretation of this  study.    DARYL KIRBY MD      Narrative    Examination: CT CHEST PULMONARY EMBOLISM W CONTRAST, 1/19/2019 6:51 PM    Comparison: Same-day ultrasound.    History: Shortness of breath; diagnosed with DVT today but reporting  SOB.    Technique: Axial thin slice images from the lung apices to the  diaphragm were obtained following the injection of intravenous  contrast media in the pulmonary arterial phase.     Contrast dose: 50cc of Isovue 370    Radiation Dose (DLP): 171 mGy*cm    Findings:   There is adequate contrast bolus in the study. There are bilateral  pulmonary emboli.   On the right there is a nonocclusive filling  defect in the peripheral right main pulmonary artery. There are  additional predominantly nonocclusive thrombi in the right upper,  middle, and lower lobar arteries with extension into the segmental and  subsegmental pulmonary arterial branches.  On the left there are  predominantly nonocclusive pulmonary emboli in the segmental and  subsegmental pulmonary arteries to the left upper and left lower lobar  pulmonary arteries.    .No evidence of right heart strain. No cardiac thrombus. Main  pulmonary artery is not dilated. Aorta and its major branches appear  patent. Normal caliber of the thoracic great vessels. Normal variant  common origin of the left common  carotid and innominate artery.    The central tracheobronchial tree is patent. Few patchy subpleural  foci of groundglass attenuation in the left apex, right upper lobe,  right lower lobe. No pneumothorax or pleural effusion. Few  subcentimeter pulmonary nodules, representative nodule along the left  major fissure measures 4 mm (series 6 image 130). Homogeneous thyroid  appearance.    The heart size is normal. No pericardial effusion. No thoracic  adenopathy.     Limited views of the abdomen reveal no acute pathology.     Mild degenerative changes of the spine. Sclerotic inferior endplate of  T11, likely degenerative. No suspicious osseous or soft tissue  lesions. Postsurgical changes of bilateral retropectoral breast  implants.      Impression    IMPRESSION:   1. Bilateral pulmonary pulmonary emboli with clot burden slightly  greater on the right. No evidence of right heart strain. Few  subpleural patchy foci of groundglass attenuation, may represent small  pulmonary infarcts versus atelectasis.  2. Few subcentimeter pulmonary nodules, largest along the left major  fissure measures 4 mm. Given small size, no dedicated follow-up is  necessary, unless the patient is considered at high risk for  developing lung cancer in which case follow-up CT in 12 months is  recommended.    [Urgent Result: Bilateral pulmonary emboli]    Finding was identified on 1/19/2019 7:05 PM.     Dr. Yuniel Glynn was contacted by Dr. Nash Lisa at 1/19/2019 7:08  PM and verbalized understanding of the urgent finding.     I have personally reviewed the examination and initial interpretation  and I agree with the findings.    DARYL KIRBY MD     Exam: Ultrasound of the deep venous system of left leg dated 1/19/2019  4:05 PM     Clinical information: Acute left calf pain     Comparison: None     Technique: Gray-scale evaluation with compression and Doppler  assessment of deep venous system for spontaneous and phasic flow, as  well as the  "presence of distal augmentation. Color flow images  obtained as needed. Gray-scale images with compression of the great  saphenous vein obtained as needed.     Findings:     Right leg:     CFV: Thrombus: No, Phasic: Yes     Left leg:     CFV: Thrombus: No. Phasic: Yes  Femoral vein, proximal: Thrombus: No. Phasic: Yes  Femoral vein, mid: Thrombus: No. Phasic: Yes  Femoral vein, distal: Thrombus: yes. Phasic: no. Partially  compressible; somewhat echogenic appearance of thrombus without  convincing vessel expansion  Proximal popliteal vein: Thrombus: yes. Phasic: no. Non compressible;  thrombus is hypoechoic expansile vessel  PTV: Thrombus: No  Peroneal vein: Thrombus: No                                                                      Impression:  1. Nearly occlusive thrombus in the left popliteal vein. Appearance  favors acute age.  2. Nonocclusive thrombus in the peripheral left femoral vein.  Appearance favors subacute age.      Reference: \"Duplex Ultrasound in the Diagnosis of Lower-Extremity Deep  Venous Thrombosis\"- Faiza Mullen MD, S; Jayce Signh MD  (Circulation. 2014;129:917-921. http://circ.ahajournals.org )     [Urgent Result: DVT, recommend evaluation by emergency department]     Finding was identified on 1/19/2019 4:05 PM.     Patient was contacted by Dr. Nash Lisa at 1/19/2019 4:20 PM and  advised to go to the emergency department for further evaluation.  Patient verbalized understanding of the findings agreed to  recommendations.     I have personally reviewed the examination and initial interpretation  and I agree with the findings.     DARYL KIRBY MD    Discharge Orders      Reason for your hospital stay    DVT and PE     Adult [facility] Follow-up and recommended labs and tests    Follow up with primary care provider, Mehreen Dempsey, within 7 days for hospital follow- up.  No follow up labs or test are needed.      Appointments on [place] and/or [facility] " (with [facility] or [facility] provider or service). Call [phone] if you haven't heard regarding these appointments within 7 days of discharge.     Activity    Your activity upon discharge: activity as tolerated     When to contact your care team    Call your primary doctor if you have any of the following: increasing leg pain, shortness of breath, palpitations, or development of chest pain.     Discharge Instructions     Full Code     Diet    Follow this diet upon discharge: Orders Placed This Encounter      Regular Diet Adult     Discharge Medications   Current Discharge Medication List      START taking these medications    Details   acetaminophen (TYLENOL) 325 MG tablet Take 2 tablets (650 mg) by mouth every 6 hours as needed for mild pain    Associated Diagnoses: Acute deep vein thrombosis (DVT) of other specified vein of left lower extremity (H)      acetaminophen-codeine (TYLENOL #3) 300-30 MG tablet Take 1 tablet by mouth every 6 hours as needed for moderate to severe pain  Qty: 12 tablet, Refills: 0    Associated Diagnoses: Acute deep vein thrombosis (DVT) of other specified vein of left lower extremity (H)      polyethylene glycol (MIRALAX/GLYCOLAX) packet Take 17 g by mouth daily as needed for constipation    Associated Diagnoses: Acute deep vein thrombosis (DVT) of other specified vein of left lower extremity (H)      !! rivaroxaban ANTICOAGULANT (XARELTO) 15 MG TABS tablet Take 1 tablet (15 mg) by mouth 2 times daily (with meals) for 21 days  Qty: 42 tablet, Refills: 0    Associated Diagnoses: Other acute pulmonary embolism without acute cor pulmonale (H)      !! rivaroxaban ANTICOAGULANT (XARELTO) 20 MG TABS tablet Take 1 tablet (20 mg) by mouth daily (with dinner) Begin taking 20mg daily after completion of 21 days taking 15mg twice daily.  Qty: 30 tablet, Refills: 1    Associated Diagnoses: Other acute pulmonary embolism without acute cor pulmonale (H)       !! - Potential duplicate medications found. Please  discuss with provider.      CONTINUE these medications which have NOT CHANGED    Details   Multiple Vitamins-Minerals (MULTIVITAMIN PO)       UNABLE TO FIND MEDICATION NAME: a drink with many vitamins         STOP taking these medications       etonogestrel-ethinyl estradiol (NUVARING) 0.12-0.015 MG/24HR vaginal ring Comments:   Reason for Stopping:             Allergies   Allergies   Allergen Reactions     Morphine Hives

## 2019-01-20 NOTE — H&P
[facility], [place]    History and Physical - Hospitalist Service, [facility]       Date of Admission:  1/19/2019    Assessment & Plan   Krystyna Smith is a 38 year old female admitted on 1/19/2019. She has no significant PMH and presents [facility] ED [facility] for evaluation of calf pain, dyspnea on exertion and shortness of breath. Imaging w/ LLE DVT and bilateral PE. Patient admitted to Medicine for further evaluation and care.     # Acute Bilateral Segmental and subsegmental PE: Patient presented w/ ~ 1 week hx of left calf pain and acute onset dyspnea on exertion, shortness of breath, palpitations, and dyspnea since 1/17/19. Recent travel  And new Rx for Nuvaring 12/2018. No hx of DVT/PE or known hx of familial cotting d/o. UTD on RHM. No recent surgeries. No recent smoking hx or malignancy dx. US LLE venous w/nearly occlusive thrombus in left popliteal vein- appears acute; nonocclusive thrombus in peripheral left femoral vein- appears subacute. CTPE: Bilateral PE w/ nonocclusive filling defect in the right main PA; predominately nonocclusive PE in the segmental and subsegmental pulmonary arteries to there left upper and left lower pulmonary arteries. No evidence of RH strain; few sub centimeter pulmonary nodules. No O2 use on admission, no tachycardia, BP stable. Likely 2/2 Nuvaring and recent travel (4-5 flights since 12/2018). Started on Lovenox in the ED. Consider IVC w/ clot burden. EKG without acute changes. UA neg. Afebrile. CBC, CMP essentially wnl.  - Tele  - ECHO if with clinical changes  - Lovenox q12h ordered x 1 dose. To receive 50 mg subcutaneous ar 8am 1/20/19. Please discuss plan for AC in am and order accordingly  - Pulse ox  - Tylenol #3 q6h PRN for pain  - Monitor closely   - Discontinue Nuvaring use.         Diet: Regular  DVT Prophylaxis: Enoxaparin (Lovenox) SQ  Parkinson Catheter: not present  Code Status: Full Code    Disposition Plan    Expected discharge: 2 - 3 days, recommended to prior living arrangement once DVT/PE treatment .  Entered: Bina Agudelo PA-C 01/19/2019, 7:53 PM     The patient's care was discussed with the Attending Physician, Dr. Salazar.    Bina Agudelo PA-C  Internal Medicine Hospitalist Service  [facility]  Pager: [phone]    Please see sticky note for cross cover information    Attestation:  I have reviewed today's vital signs, medications, labs and imaging.      Alberto Salazar,   Pager [phone]  Krystyna Smith was seen in the hospital by Alberto Salazar on Jan 19th with Bina Agudelo.  I reviewed the history & exam, PMH, SH, FH, labs and imginag. Assessment and plan were jointly made.  I agree with and have edited the note and plan of care. In brief, 38 year old otherwise healthy active female on birth control w/ recent airplane ride, admitted w/ a few days of calf pain w/ SOB/ROGER w/ DVT / b/l PE w/ notable clot burden.  10 point ROS otherwise negative. PE: well developed female, lying in bed at 45 degree angle, comfortable on room air, speaking full sentences.  No oral lesions, heart mildly tachycardic w/out murmurs noted, lung cta b/l, abdomen soft non tender, LLE tenderness behind knee, no cording or edema / erythema.  Labs and imaging reviewed.  Assessment: DVT w/ b/l PE, no signs of cardiac strain.  Anticoagulation, consider IVC filter and cardiac monitoring o/n.    Alberto Salazar MD.    ______________________________________________________________________    Chief Complaint   Shortness of breath and bilateral calf pain    History is obtained from the patient and EMR    History of Present Illness   Krystyna Smith is a 38 year old female who presented to the ED for evaluation of roger, sob, left calf pain.     Patient reports that she was in  stats of health until ~7 days ago when she developed acute onset left calf pain that became progressively worse. She thought it was  a muscle strain, though persisted and noted intractable pain.Approximately 3 days ago patient noted to have progressive MULTANI, sob and inability to complete sentences. She presented to [facility] and referred to ED for evaluation. No hx of DVT/PE. On many supplements (will bring in list). Recent travel (4-5 trips since 2018) most recently arrived back from [place] on 19 (day before onset of sx), also noted to start Nuvaring 2018. UTD on RHM, no hx of recent surgery, no malignancy hx. Not current smoker.       Currently, Patient w/o sob at rest. No dyspnea. Persists with left calf pain. We discussed plan as outliend above and patient agreeable.        Patient does not endorse: headaches, changes in vision, chest pain, palpitations, upper respiratory symptoms of rhinorrhea or congestion,  cough, sputum production, wheezing, abdominal pain, nausea, emesis, constipation, diarrhea, dysuria, edema, rashes, weakness, focal neurologic deficits,  illness, fever, chills.         Review of Systems    The 10 point Review of Systems is negative other than noted in the HPI or here.     Past Medical History    I have reviewed this patient's medical history and updated it with pertinent information if needed.   Past Medical History:   Diagnosis Date     Headache 2010     Problem list name updated by automated process. Provider to review     NO ACTIVE PROBLEMS        Past Surgical History   I have reviewed this patient's surgical history and updated it with pertinent information if needed.  Past Surgical History:   Procedure Laterality Date     C/SECTION, LOW TRANSVERSE      , Low Transverse     HC ENLARGE BREAST WITH IMPLANT  2017    saline     LASIK  2017     VAGINOPLASTY N/A 3/14/2018    Procedure: VAGINOPLASTY;  Revision Of Vaginal Scar Tissue ;  Surgeon: Trina Gaming MD;  Location: [facility] OR       Social History   I have reviewed this patient's social history and updated it  with pertinent information if needed.  Social History     Tobacco Use     Smoking status: Former Smoker     Last attempt to quit: 2005     Years since quittin.7     Smokeless tobacco: Never Used   Substance Use Topics     Alcohol use: Yes     Comment: occ     Drug use: No       Family History   I have reviewed this patient's family history and updated it with pertinent information if needed.   Family History   Problem Relation Age of Onset     Allergies Mother      Ovarian Cancer Mother 62        YONATAN genetic carrier     Cancer - colorectal Maternal Grandfather      Cerebrovascular Disease Paternal Grandmother      Heart Disease Paternal Grandfather         older     Breast Cancer Maternal Aunt 72       Prior to Admission Medications   Prior to Admission Medications   Prescriptions Last Dose Informant Patient Reported? Taking?   Multiple Vitamins-Minerals (MULTIVITAMIN PO)   Yes No   UNABLE TO FIND   Yes No   Sig: MEDICATION NAME: a drink with many vitamins   etonogestrel-ethinyl estradiol (NUVARING) 0.12-0.015 MG/24HR vaginal ring   No No   Sig: Insert 1 ring vaginally every 21 days then remove for 1 week then repeat with new ring.      Facility-Administered Medications: None     Allergies   Allergies   Allergen Reactions     Morphine Hives       Physical Exam   Vital Signs: Temp: 97.7  F (36.5  C) Temp src: Oral BP: 126/83 Pulse: 73 Heart Rate: 58 Resp: 16 SpO2: 100 % O2 Device: None (Room air)    Weight: 110 lbs 4.8 oz      Physical Exam   Constitutional: Pleasant female sitting up in bed. NAD  Well nourished, well developed, resting comfortably   HEENT:   Head: Normocephalic and atraumatic.   Eyes: Conjunctivae are normal. Pupils are equal, round, and reactive to light.  Pharynx has no erythema or exudate, mucous membranes are moist  Neck:   No adenopathy, no bony tenderness  Cardiovascular: Regular rate and rhythm without murmurs or gallops  Pulmonary/Chest: Clear to auscultation bilaterally, with no  wheezes or retractions. No respiratory distress.  GI: Soft with good bowel sounds.  Non-tender, non-distended, with no guarding, no rebound, no peritoneal signs.   Back:  No bony or CVA tenderness   Musculoskeletal:  LLE calf mild TTP. No palpable cord.   Skin: Skin is warm and dry. No rash noted.   Neurological: Alert and oriented to person, place, and time. Nonfocal exam  Psychiatric:  Normal mood and affect.      Data   Data reviewed today: I reviewed all medications, new labs and imaging results over the last 24 hours. I personally reviewed recent imaging impressions, daily labs, progress notes     Recent Labs   Lab 01/19/19  1904 01/19/19  1716   WBC 6.6  --    HGB 12.2  --    MCV 97  --      --    NA  --  136   POTASSIUM  --  3.9   CHLORIDE  --  101   CO2  --  26   BUN  --  35*   CR  --  0.57   ANIONGAP  --  9   RANDY  --  9.4   GLC  --  88

## 2019-01-20 NOTE — ED NOTES
[facility], [place]   ED Nurse to Floor Handoff     Krystyna Smith is a 38 year old female who speaks English and lives alone,  in a home  They arrived in the ED by car from clinic    ED Chief Complaint: Leg Pain ( DVT in left leg)    ED Dx;   Final diagnoses:   Other acute pulmonary embolism without acute cor pulmonale (H)   Acute deep vein thrombosis (DVT) of other specified vein of left lower extremity (H)         Needed?: No    Allergies:   Allergies   Allergen Reactions     Morphine Hives   .  Past Medical Hx:   Past Medical History:   Diagnosis Date     Headache 8/6/2010     Problem list name updated by automated process. Provider to review     NO ACTIVE PROBLEMS       Baseline Mental status: WDL  Current Mental Status changes: at basesline    Infection present or suspected this encounter: no  Sepsis suspected: No  Isolation type: No active isolations     Activity level - Baseline/Home:  Independent  Activity Level - Current:   Independent    Bariatric equipment needed?: No    In the ED these meds were given:   Medications   enoxaparin (LOVENOX) injection 50 mg (not administered)   iopamidol (ISOVUE-370) solution 50 mL (50 mLs Intravenous Given 1/19/19 1839)   sodium chloride 0.9 % bag 500mL for CT scan flush use (82 mLs Intravenous Given 1/19/19 1839)       Drips running?  No    Home pump  No    Current LDAs  Peripheral IV 01/19/19 Right (Active)   Site Assessment WDL 1/19/2019  5:31 PM   Line Status Saline locked 1/19/2019  5:31 PM   Number of days: 0       Incision/Surgical Site 03/14/18 Perineum (Active)   Number of days: 311       Labs results:   Labs Ordered and Resulted from Time of ED Arrival Up to the Time of Departure from the ED   BASIC METABOLIC PANEL - Abnormal; Notable for the following components:       Result Value    Urea Nitrogen 35 (*)     All other components within normal limits   ROUTINE UA WITH MICROSCOPIC - Abnormal; Notable for the following  "components:    Blood Urine Small (*)     Mucous Urine Present (*)     All other components within normal limits   CBC WITH PLATELETS DIFFERENTIAL - Abnormal; Notable for the following components:    RBC Count 3.73 (*)     All other components within normal limits   HCG QUALITATIVE URINE   PERIPHERAL IV CATHETER       Imaging Studies:   Recent Results (from the past 24 hour(s))   US Venous lower extremity lt   Result Value    Radiologist flags DVT, recommend evaluation by emergency department (Urgent)    Narrative    Exam: Ultrasound of the deep venous system of left leg dated 1/19/2019  4:05 PM    Clinical information: Acute left calf pain    Comparison: None    Technique: Gray-scale evaluation with compression and Doppler  assessment of deep venous system for spontaneous and phasic flow, as  well as the presence of distal augmentation. Color flow images  obtained as needed. Gray-scale images with compression of the great  saphenous vein obtained as needed.    Findings:    Right leg:    CFV: Thrombus: No, Phasic: Yes    Left leg:    CFV: Thrombus: No. Phasic: Yes  Femoral vein, proximal: Thrombus: No. Phasic: Yes  Femoral vein, mid: Thrombus: No. Phasic: Yes  Femoral vein, distal: Thrombus: yes. Phasic: no. Partially  compressible; somewhat echogenic appearance of thrombus without  convincing vessel expansion  Proximal popliteal vein: Thrombus: yes. Phasic: no. Non compressible;  thrombus is hypoechoic expansile vessel  PTV: Thrombus: No  Peroneal vein: Thrombus: No      Impression    Impression:  1. Nearly occlusive thrombus in the left popliteal vein. Appearance  favors acute age.  2. Nonocclusive thrombus in the peripheral left femoral vein.  Appearance favors subacute age.     Reference: \"Duplex Ultrasound in the Diagnosis of Lower-Extremity Deep  Venous Thrombosis\"- Faiza Mullen MD, S; Jayce Singh MD  (Circulation. 2014;129:917-921. http://circ.ahajournals.org )    [Urgent Result: DVT, recommend " evaluation by emergency department]    Finding was identified on 1/19/2019 4:05 PM.    Patient was contacted by Dr. Nash Lisa at 1/19/2019 4:20 PM and  advised to go to the emergency department for further evaluation.  Patient verbalized understanding of the findings agreed to  recommendations.    I have personally reviewed the examination and initial interpretation  and I agree with the findings.    DARYL KIRBY MD   Chest CT, IV contrast only - PE protocol   Result Value    Radiologist flags Bilateral pulmonary emboli (Urgent)    Impression    IMPRESSION:   1. Bilateral pulmonary pulmonary emboli with clot burden slightly  greater on the right. No evidence of right heart strain. Few  subpleural patchy foci of groundglass attenuation, may represent small  pulmonary infarcts versus atelectasis.  2. Few subcentimeter pulmonary nodules, largest along the left major  fissure measures 4 mm. Recommended follow-up per Flejtner.    [Urgent Result: Bilateral pulmonary emboli]    Finding was identified on 1/19/2019 7:05 PM.     Dr. Yuniel Glynn was contacted by Dr. Nash Lisa at 1/19/2019 7:08  PM and verbalized understanding of the urgent finding.        Recent vital signs:   /83   Pulse 73   Temp 97.7  F (36.5  C) (Oral)   Resp 16   Wt 50 kg (110 lb 4.8 oz)   SpO2 100%   BMI 20.84 kg/m      Cardiac Rhythm: Normal Sinus  Pt needs tele? Yes  Skin/wound Issues: None    Code Status: Full Code    Pain control: good    Nausea control: good    Abnormal labs/tests/findings requiring intervention: CT shows bilateral PE    Family present during ED course? No   Family Comments/Social Situation comments: NA    Tasks needing completion: None    Silvino Hernandez, RN    [phone] [facility]

## 2019-01-23 ENCOUNTER — OFFICE VISIT (OUTPATIENT)
Dept: FAMILY MEDICINE | Facility: CLINIC | Age: 39
End: 2019-01-23
Payer: COMMERCIAL

## 2019-01-23 VITALS
BODY MASS INDEX: 20.96 KG/M2 | TEMPERATURE: 98.4 F | HEIGHT: 61 IN | SYSTOLIC BLOOD PRESSURE: 116 MMHG | RESPIRATION RATE: 12 BRPM | HEART RATE: 74 BPM | WEIGHT: 111 LBS | OXYGEN SATURATION: 97 % | DIASTOLIC BLOOD PRESSURE: 73 MMHG

## 2019-01-23 DIAGNOSIS — I26.99 PE (PULMONARY THROMBOEMBOLISM) (H): Primary | ICD-10-CM

## 2019-01-23 DIAGNOSIS — Z30.8 ENCOUNTER FOR OTHER CONTRACEPTIVE MANAGEMENT: ICD-10-CM

## 2019-01-23 PROCEDURE — 99214 OFFICE O/P EST MOD 30 MIN: CPT | Performed by: NURSE PRACTITIONER

## 2019-01-23 ASSESSMENT — MIFFLIN-ST. JEOR: SCORE: 1120.87

## 2019-01-23 NOTE — PATIENT INSTRUCTIONS
Patient Education     Discharge Instructions for Pulmonary Embolism  A deep vein thrombosis (DVT) is a blood clot in a large vein deep in a leg, arm, or elsewhere in the body. The clot can separate from the vein, travel to the lungs and cut off blood flow. This is a pulmonary embolism (PE). Pulmonary embolism is very serious and may cause death.   Healthcare providers use the term venous thromboembolism (VTE) to describe both DVT and PE. They use the term VTE because the two conditions are very closely related. And, because their prevention and treatment are closely related.   Home care  Taking care of yourself is very important. To help prevent more blood clots from forming, follow your healthcare provider's instructions. Do the following:    Take your medicines exactly as instructed. Don t skip doses. If you miss a dose, call your healthcare provider and ask what you should do.      Have all lab tests as recommended. This is very important when you take medicines to prevent blood clots.     If your healthcare provider has instructed you to do so, wear elastic (compression stockings).    Get up and get moving.    While sitting for long periods of time, move your knees, ankles, feet, and toes.  Lifestyle changes  To help prevent problems with your heart and blood vessels, do the following:     If you smoke, get help to quit. Talk with your healthcare provider about medicines and programs that can help.    Stay at a healthy weight. Talk to your healthcare provider about losing weight, if you are overweight    Try to exercise at least 30 minutes on most days. Before starting an exercise program, talk with your healthcare provider.     When traveling by car, make frequent stops to get up and move around.    On long airplane rides, get up and move around when possible. If you can t get up, wiggle your toes, move your ankles and tighten your calves to keep your blood moving.  Follow-up care  Make a follow-up appointment  as directed. Have your lab work done as directed.     When to call your healthcare provider  Call your healthcare provider right away if you have:    Pain, swelling, and redness in your leg, arm, or other body area. These symptoms may mean another blood clot.    Blood in your urine    Bleeding with bowel movements    Bleeding from the nose, gums, a cut, or vagina  Call 911  Call 911 if you have symptoms of a blood clot in the lungs:     Chest pain    Trouble breathing    Coughing (may cough up blood)    Fast heartbeat    Sweating    Fainting  Also call 911 if you have:    Heavy or uncontrolled bleeding. If you are taking a blood thinner, you have an increased chance of bleeding.   Date Last Reviewed: 2/1/2017 2000-2018 [employer]. [address] [place] [address], [place] [address]. All rights reserved. This information is not intended as a substitute for professional medical care. Always follow your healthcare professional's instructions.

## 2019-01-23 NOTE — PROGRESS NOTES
SUBJECTIVE:   Krystyna Smith is a 38 year old female who presents to clinic today for the following health issues:  Chief Complaint   Patient presents with     Hospital F/U       Hospital Follow-up Visit:  Hospital/Nursing Home/IP Rehab Facility: [facility]  Date of Admission: 01/19/2019  Date of Discharge: 01/20/2019  Reason(s) for Admission: Acute nilateral lobar, segmental and subsegmental pulmonary embolus without hemodynamic compromise  Acute left lower extremity deep venous thrombosis           Problems taking medications regularly:  None       Medication changes since discharge: None       Problems adhering to non-medication therapy:  None    Krystyna was hospitalized January 19 through the 20th after evaluation for left lower calf/extremity pain.  Ultrasound proved a DVT.  The patient at the time of her visit did state she was also having some shortness of breath that was unusual for her.  A pulmonary embolism was also noted.  She was started on Xarelto.  She has been taking her medication as prescribed.  She reports her left lower extremity pain and her breathing are getting better.  She denies any side effects of the medication.  She is eating and drinking normally.  She has no signs of bleeding, blood in her stool, active bleeding etc.  She is here today wondering about her next steps with Xarelto.    This is felt to be provoked clotting due to her NuvaRing and travel/excessive sitting.    Summary of hospitalization:  [facility] discharge summary reviewed  Diagnostic Tests/Treatments reviewed.  Follow up needed: none  Other Healthcare Providers Involved in Patient s Care:         None  Update since discharge: improved.     Post Discharge Medication Reconciliation: discharge medications reconciled, continue medications without change.  Plan of care communicated with patient     Coding guidelines for this visit:  Type of Medical   Decision Making Face-to-Face Visit        within 7 Days of discharge Face-to-Face Visit        within 14 days of discharge   Moderate Complexity 12621 [id number]   High Complexity 30107 11436                  Problem list and histories reviewed & adjusted, as indicated.  Additional history: as documented    Patient Active Problem List   Diagnosis     CARDIOVASCULAR SCREENING; LDL GOAL LESS THAN 160     Pulmonary embolism (H)     PE (pulmonary thromboembolism) (H)     Past Surgical History:   Procedure Laterality Date     C/SECTION, LOW TRANSVERSE      , Low Transverse     HC ENLARGE BREAST WITH IMPLANT  2017    saline     LASIK  2017     VAGINOPLASTY N/A 3/14/2018    Procedure: VAGINOPLASTY;  Revision Of Vaginal Scar Tissue ;  Surgeon: Trina Gaming MD;  Location: [facility] OR       Social History     Tobacco Use     Smoking status: Former Smoker     Last attempt to quit: 2005     Years since quittin.8     Smokeless tobacco: Never Used   Substance Use Topics     Alcohol use: Yes     Comment: occ     Family History   Problem Relation Age of Onset     Allergies Mother      Ovarian Cancer Mother 62        YONATAN genetic carrier     Cancer - colorectal Maternal Grandfather      Cerebrovascular Disease Paternal Grandmother      Heart Disease Paternal Grandfather         older     Breast Cancer Maternal Aunt 72         Current Outpatient Medications   Medication Sig Dispense Refill     acetaminophen (TYLENOL) 325 MG tablet Take 2 tablets (650 mg) by mouth every 6 hours as needed for mild pain       acetaminophen-codeine (TYLENOL #3) 300-30 MG tablet Take 1 tablet by mouth every 6 hours as needed for moderate to severe pain 12 tablet 0     Multiple Vitamins-Minerals (MULTIVITAMIN PO)        rivaroxaban ANTICOAGULANT (XARELTO) 15 MG TABS tablet Take 1 tablet (15 mg) by mouth 2 times daily (with meals) for 21 days 42 tablet 0     UNABLE TO FIND MEDICATION NAME: a drink with many vitamins       polyethylene glycol (MIRALAX/GLYCOLAX) packet Take  "17 g by mouth daily as needed for constipation (Patient not taking: Reported on 1/23/2019)       rivaroxaban ANTICOAGULANT (XARELTO) 20 MG TABS tablet Take 1 tablet (20 mg) by mouth daily (with dinner) Begin taking 20mg daily after completion of 21 days taking 15mg twice daily. (Patient not taking: Reported on 1/23/2019) 30 tablet 1     Allergies   Allergen Reactions     Morphine Hives     Recent Labs   Lab Test 01/20/19  0604 01/19/19  1716 08/30/17  0755 09/09/16  1417 10/04/13  0850   A1C  --   --  5.0 4.9  --    LDL  --   --  53 85  --    HDL  --   --  128 88  --    TRIG  --   --  50 102  --    ALT  --  35  --   --   --    CR 0.60 0.57  --   --   --    GFRESTIMATED >90 >90  --   --   --    GFRESTBLACK >90 >90  --   --   --    POTASSIUM 3.9 3.9  --   --   --    TSH  --   --   --  2.14 2.05      BP Readings from Last 3 Encounters:   01/23/19 116/73   01/20/19 116/78   11/27/18 110/72    Wt Readings from Last 3 Encounters:   01/23/19 50.3 kg (111 lb)   01/19/19 50 kg (110 lb 3.2 oz)   01/19/19 48.5 kg (107 lb)                  Labs reviewed in EPIC    Reviewed and updated as needed this visit by clinical staff       Reviewed and updated as needed this visit by Provider         ROS:  Constitutional, HEENT, cardiovascular, pulmonary, GI, , musculoskeletal, neuro, skin, endocrine and psych systems are negative, except as otherwise noted.    OBJECTIVE:     /73   Pulse 74   Temp 98.4  F (36.9  C)   Resp 12   Ht 1.549 m (5' 1\")   Wt 50.3 kg (111 lb)   SpO2 97%   BMI 20.97 kg/m    Body mass index is 20.97 kg/m .  Constitutional: healthy, alert and no distress  Neck: supple, no adenopathy  Cardiovascular: RRR. No murmurs, clicks gallops or rub  Respiratory: Respirations easy and regular. No respiratory distress noted. Lung sounds clear to auscultation.  Gastrointestinal: abdomen soft, nontender to light or deep palpation. Bowel sounds active in 4 quadrants. No hepatosplenomegaly. No rebound or " guarding.  Neurologic: Gait normal. Reflexes normal and symmetric. Sensation grossly WNL.  MS: ambulatory with a steady gait.  Psychiatric: mentation appears normal and affect normal/bright        ASSESSMENT/PLAN:     (I26.99) PE (pulmonary thromboembolism) (H)  (primary encounter diagnosis)  Comment:   Plan: ONC/HEME ADULT REFERRAL        See below    (Z30.8) Encounter for other contraceptive management  Comment:   Plan: OB/GYN REFERRAL          For now, the patient will continue her Xarelto as prescribed.  I discussed signs and symptoms of bleeding, and reasons to be reevaluated.    She is to follow-up with hematology oncology for additional discussion, workup, And instructions for the future  I did tell Krystyna that from what I can tell, she is to anticipate being on this drug for at least 12 weeks.  I would not doubt if they had her extend that time as well as they would be the group to decide about a follow-up CT scan to make sure that pulmonary embolism is cleared.  In the event that she leaves her hematology oncology appointment with any questions, she is let me know.  Otherwise I will expect that they have given her full instructions about DVTs, DVT management, Xarelto, follow-up imaging etc.  She agrees and understands.  Questions were answered.    Regarding her birth control, she is no longer using the NuvaRing.  She will follow-up with OB/GYN for additional contraceptive options/likely IUD etc.  .  CORINNE [facility]

## 2019-02-19 ENCOUNTER — OFFICE VISIT (OUTPATIENT)
Dept: HEMATOLOGY | Facility: CLINIC | Age: 39
End: 2019-02-19
Attending: INTERNAL MEDICINE
Payer: COMMERCIAL

## 2019-02-19 ENCOUNTER — OFFICE VISIT (OUTPATIENT)
Dept: OBGYN | Facility: CLINIC | Age: 39
End: 2019-02-19
Payer: COMMERCIAL

## 2019-02-19 VITALS
HEART RATE: 86 BPM | RESPIRATION RATE: 12 BRPM | SYSTOLIC BLOOD PRESSURE: 133 MMHG | BODY MASS INDEX: 20.77 KG/M2 | WEIGHT: 110 LBS | DIASTOLIC BLOOD PRESSURE: 85 MMHG | TEMPERATURE: 98 F | HEIGHT: 61 IN | OXYGEN SATURATION: 99 %

## 2019-02-19 VITALS
HEART RATE: 70 BPM | DIASTOLIC BLOOD PRESSURE: 73 MMHG | TEMPERATURE: 97.8 F | WEIGHT: 114 LBS | SYSTOLIC BLOOD PRESSURE: 116 MMHG | BODY MASS INDEX: 21.54 KG/M2

## 2019-02-19 DIAGNOSIS — Z30.430 ENCOUNTER FOR IUD INSERTION: Primary | ICD-10-CM

## 2019-02-19 DIAGNOSIS — I26.99 PE (PULMONARY THROMBOEMBOLISM) (H): Primary | ICD-10-CM

## 2019-02-19 LAB — BETA HCG QUAL IFA URINE: NEGATIVE

## 2019-02-19 PROCEDURE — 84703 CHORIONIC GONADOTROPIN ASSAY: CPT | Performed by: OBSTETRICS & GYNECOLOGY

## 2019-02-19 PROCEDURE — G0463 HOSPITAL OUTPT CLINIC VISIT: HCPCS

## 2019-02-19 PROCEDURE — 99203 OFFICE O/P NEW LOW 30 MIN: CPT | Performed by: INTERNAL MEDICINE

## 2019-02-19 PROCEDURE — 58300 INSERT INTRAUTERINE DEVICE: CPT | Performed by: OBSTETRICS & GYNECOLOGY

## 2019-02-19 RX ORDER — COPPER 313.4 MG/1
1 INTRAUTERINE DEVICE INTRAUTERINE ONCE
COMMUNITY

## 2019-02-19 RX ORDER — COPPER 313.4 MG/1
1 INTRAUTERINE DEVICE INTRAUTERINE ONCE
Start: 2019-02-19 | End: 2019-04-23

## 2019-02-19 ASSESSMENT — MIFFLIN-ST. JEOR: SCORE: 1116.34

## 2019-02-19 ASSESSMENT — PAIN SCALES - GENERAL: PAINLEVEL: MODERATE PAIN (4)

## 2019-02-19 NOTE — NURSING NOTE
Krystyna Smith is here for a visit with Dr. Carey. She is being seen for anticoagulation guidance in light of recent LLE DVT and PE.     RN welcomed and introduced Krystyna to our center and provided her with a contact card containing our information.    Medications, and allergies were then reviewed, updated and reconciled with outside records.    I asked her if she had any specific concerns that she wanted to address and communicated these with the provider. I then reviewed which provider she was going to be seeing today and the expected timing of that provider.    Together we reviewed the plan that she would continue to take Xarelto 20mg daily. She will return for follow up visit and ultrasound in 2 months. At this time Dr. Carey and Krystyna will discuss need for further anticoagulation and prophylactic dosing regarding airplanes. The AVS instructions were then updated and given to the patient.    I then thanked Krystyna for coming and encouraged her to call should she have any questions or concerns.    Alivia Guevara RN - Nurse Clinician - [facility] - [phone]

## 2019-02-19 NOTE — NURSING NOTE
"Chief Complaint   Patient presents with     IUD     NDC: 34519-864-78 LOT#: [id number] EXP: 2024       Initial /73   Pulse 70   Temp 97.8  F (36.6  C) (Oral)   Wt 51.7 kg (114 lb)   BMI 21.54 kg/m   Estimated body mass index is 21.54 kg/m  as calculated from the following:    Height as of 19: 1.549 m (5' 1\").    Weight as of this encounter: 51.7 kg (114 lb).  BP completed using cuff size: regular    Questioned patient about current smoking habits.  Pt. has never smoked.          The following HM Due: NONE      The following patient reported/Care Every where data was sent to:  P ABSTRACT QUALITY INITIATIVES [[id number]]  na      n/a              "

## 2019-02-19 NOTE — PROGRESS NOTES
GYN clinic visit  2019    CC: IUD insertion    HPI:   Krystyna Smith is a 38 year old  who presents to clinic today for an IUD insertion.  She has used Nuva Ring and Mirena IUD for contraception in the past. She has a history of no STIs. She denies current abnormal vaginal discharge. LMP end of January. Her menses are irregular, every month but sometimes skips months. Last Pap smear was 18 NIL.     Wants Paragard IUD for contraception.  Had DVT and PE in mid January.Was in [place], running a lot and had Nuva Ring.  Seeing hematology.   Had a Mirena IUD in the past.     Reviewed GYN hx, OB hx, past medical hx, surgical hx and family hx.   Reviewed medications and allergies. Changes made in EPIC.     OBJECTIVE:  Vitals:    19 1153   BP: 116/73   Pulse: 70   Temp: 97.8  F (36.6  C)   TempSrc: Oral   Weight: 51.7 kg (114 lb)     Body mass index is 21.54 kg/m .    Gen: alert, oriented, no distress, cooperative and pleasant  Abd: soft, nontender, nondistended, normoactive bowel sounds, no masses  : normal external genitalia without lesions or abnormalities. Normal [place]tholin's, normal [place]'s, normal urethra. Normal vaginal mucosa, no abnormal discharge or lesions. Cervix appears normal, no lesions or erythema. Bimanual exam reveals 6 week sized mid position mobile uterus, no cervical motion tenderness, no uterine tenderness, no adnexal masses.    PROCEDURE:  Patient has verbalized understanding of risks and benefits. She was counseled on risks of infection, bleeding, uterine perforation, cervical laceration, expulsion, overall risk of pregnancy 2 in 1000, if she does get pregnant that there is an increased risk of ectopic pregnancy. All questions answered. She has signed the consent form.    Urine pregnancy test was negative.      A medium antoinette speculum was placed in the vagina with good visualization of the cervix.  The cervix was then swabbed with a betadine x3.  Tenaculum was placed  at the 12 o'clock position on the cervix and the uterus sounded to 7.5cm.  The Paragard T-380  IUD was then placed in the usual fashion under sterile technique without difficulty.  Strings were clipped about 2-3 cm from the cervical os.  Tenaculum was removed. Silver nitrate was used to achieve hemostasis at tenaculum puncture sites. There were no complications. The patient tolerated the procedure well.    NDC: 10651-317-86 LOT#: [id number] EXP: 2024  ASSESSMENT:   Krystyna Smith is a 38 year old  who had a Paragard IUD inserted today without complication.    PLAN:  1. Encounter for IUD insertion  - Beta HCG qual IFA urine  - paragard intrauterine copper device; 1 each by Intrauterine route once for 1 dose  - INTRAUT COPPER CONTRACEPTIVE  - INSERTION INTRAUTERINE DEVICE      The patient should feel for the IUD strings in 2 weeks.  If unable to locate them, she should return to clinic for a speculum examination for confirmation that the IUD is in place. Bleeding pattern of this particular IUD was discussed with the patient. She is aware that the IUD will need to be removed in 10 years or PRN.  She is to return to clinic for her next annual or PRN.    Kelsie Lanier MD

## 2019-02-19 NOTE — PATIENT INSTRUCTIONS
1. Continue taking your Xarelto 20 mg daily.  2. We would like you to follow up with us in 2 months for your 3 month check up. Prior to this appointment we would like you to obtain an ultrasound to look for residual clot. Based upon this we will decide your future course of anticoagulation.  3. Please hold your Xarelto the day before your appointment, we will be doing lab work at your visit.  4. We recommend using compression stockings on your flights. Please contact us if you need a prescription.  5. Please call the [facility] with any questions, comments or concerns [phone].    Alivia Guevara RN - Nurse Clinician - [facility] - [phone]

## 2019-02-20 NOTE — PROGRESS NOTES
[facility]  [address] [place] [id number]  Phone: [phone], Fax: [phone]    Outpatient Visit Note:    Patient: Krystyna Smith  MRN: [id number]  : 1980  CHANTELL: 2019    Reason for Consultation:  Krystyna Smith is a referred for evaluation and treatment of VTE .    History of Present Illness:  Krystyna Smith is a 38 year old female with no significant past medical history who has had multiple surgeries and a pregnancy in the past for cosmetic reasons without any thrombotic complications.  presented with approximately 1 week of left calf pain/swelling and the onset of dyspnea on exertion and palpitations since 19 , she she had had a NuvaRing placement a few weeks before as well as a travel to [place] preceding the symptoms she was evaluated in the emergency room underwent a CT scan and ultrasound of lower extremities and was found to have.   Clot burden she was started on Xarelto, and discharged for follow-up in clinic.     She states that she has done well on Xarelto without any complications in terms of bleeding.  She actually underwent copper  T placement today and is struggling with pain related to that but otherwise denies any complaints whatsoever.  She states her left leg is still a little bit swollen than the other one and it worsened through the course of the day.    Past Medical History:  Past Medical History:   Diagnosis Date     Headache 2010     Problem list name updated by automated process. Provider to review     NO ACTIVE PROBLEMS        Past Surgical History:  Past Surgical History:   Procedure Laterality Date     C/SECTION, LOW TRANSVERSE      , Low Transverse     HC ENLARGE BREAST WITH IMPLANT  2017    saline     LASIK  2017     VAGINOPLASTY N/A 3/14/2018    Procedure: VAGINOPLASTY;  Revision Of Vaginal Scar Tissue ;  Surgeon: Trina Gaming MD;  Location:  OR        Medications:  Current Outpatient Medications   Medication Sig Dispense Refill     Multiple Vitamins-Minerals (MULTIVITAMIN PO)        paragard intrauterine copper device 1 each by Intrauterine route once Date of insertoin 2/19/19       rivaroxaban ANTICOAGULANT (XARELTO) 20 MG TABS tablet Take 1 tablet (20 mg) by mouth daily (with dinner) Begin taking 20mg daily after completion of 21 days taking 15mg twice daily. 30 tablet 1     UNABLE TO FIND MEDICATION NAME: a drink with many vitamins          Allergies:  Allergies   Allergen Reactions     Morphine Hives       ROS:  Denies any bleeding issues. No gum bleeding, No nose bleed. Denies any hematuria or blood in stools. Denies any ecchymosis. Denies any lower extremities swelling or pain. Denies any fever, no chest pain. Denies any shortness of breath.     Social History:  Denies any tobacco use. No significant alcohol use. Denies any illicit drug use. Patient works as a rep for [employer]    Family History:  Family History   Problem Relation Age of Onset     Allergies Mother      Ovarian Cancer Mother 62        YONATAN genetic carrier     Cancer - colorectal Maternal Grandfather      Cerebrovascular Disease Paternal Grandmother      Heart Disease Paternal Grandfather         older     Breast Cancer Maternal Aunt 72         Objectives:  Pleasant 38 year old female in no acute distress.  Vitals: B/P: 133/85, T: 98, P: 86, R: 12, Wt: 110 lbs 0 oz  Exam:   Gen: Appears well, no distress  HEENT: No scleral icterus or hemorrahge, no wet purpura, no lymphadenopathy  Ext: 1+ edema on the left side   MSK: no abn     Labs:    Results for MARLEEN THOMAS (MRN [id number]) as of 2/20/2019 11:42   Ref. Range 1/20/2019 06:04   Sodium Latest Ref Range: 133 - 144 mmol/L 138   Potassium Latest Ref Range: 3.4 - 5.3 mmol/L 3.9   Chloride Latest Ref Range: 94 - 109 mmol/L 106   Carbon Dioxide Latest Ref Range: 20 - 32 mmol/L 25   Urea Nitrogen Latest Ref Range: 7 - 30 mg/dL 18    Creatinine Latest Ref Range: 0.52 - 1.04 mg/dL 0.60   GFR Estimate Latest Ref Range: >60 mL/min/1.73_m2 >90   GFR Estimate If Black Latest Ref Range: >60 mL/min/1.73_m2 >90   Calcium Latest Ref Range: 8.5 - 10.1 mg/dL 8.7   Anion Gap Latest Ref Range: 3 - 14 mmol/L 8   Glucose Latest Ref Range: 70 - 99 mg/dL 73   WBC Latest Ref Range: 4.0 - 11.0 10e9/L 6.6   Hemoglobin Latest Ref Range: 11.7 - 15.7 g/dL 13.1   Hematocrit Latest Ref Range: 35.0 - 47.0 % 39.6   Platelet Count Latest Ref Range: 150 - 450 10e9/L 201   RBC Count Latest Ref Range: 3.8 - 5.2 10e12/L 4.03   MCV Latest Ref Range: 78 - 100 fl 98   MCH Latest Ref Range: 26.5 - 33.0 pg 32.5   MCHC Latest Ref Range: 31.5 - 36.5 g/dL 33.1   RDW Latest Ref Range: 10.0 - 15.0 % 12.5     Imaging:  Exam: Ultrasound of the deep venous system of left leg dated 1/19/2019  4:05 PM     Clinical information: Acute left calf pain     Comparison: None     Technique: Gray-scale evaluation with compression and Doppler  assessment of deep venous system for spontaneous and phasic flow, as  well as the presence of distal augmentation. Color flow images  obtained as needed. Gray-scale images with compression of the great  saphenous vein obtained as needed.     Findings:     Right leg:     CFV: Thrombus: No, Phasic: Yes     Left leg:     CFV: Thrombus: No. Phasic: Yes  Femoral vein, proximal: Thrombus: No. Phasic: Yes  Femoral vein, mid: Thrombus: No. Phasic: Yes  Femoral vein, distal: Thrombus: yes. Phasic: no. Partially  compressible; somewhat echogenic appearance of thrombus without  convincing vessel expansion  Proximal popliteal vein: Thrombus: yes. Phasic: no. Non compressible;  thrombus is hypoechoic expansile vessel  PTV: Thrombus: No  Peroneal vein: Thrombus: No                                                                      Impression:  1. Nearly occlusive thrombus in the left popliteal vein. Appearance  favors acute age.  2. Nonocclusive thrombus in the peripheral  "left femoral vein.  Appearance favors subacute age.      Reference: \"Duplex Ultrasound in the Diagnosis of Lower-Extremity Deep  Venous Thrombosis\"- Faiza Mullen MD, S; Jayce Singh MD  (Circulation. 2014;129:917-921. http://circ.ahajournals.org )     [Urgent Result: DVT, recommend evaluation by emergency department]     Finding was identified on 1/19/2019 4:05 PM.    Examination: CT CHEST PULMONARY EMBOLISM W CONTRAST, 1/19/2019 6:51 PM     Comparison: Same-day ultrasound.     History: Shortness of breath; diagnosed with DVT today but reporting  SOB.     Technique: Axial thin slice images from the lung apices to the  diaphragm were obtained following the injection of intravenous  contrast media in the pulmonary arterial phase.      Contrast dose: 50cc of Isovue 370     Radiation Dose (DLP): 171 mGy*cm     Findings:   There is adequate contrast bolus in the study. There are bilateral  pulmonary emboli.   On the right there is a nonocclusive filling  defect in the peripheral right main pulmonary artery. There are  additional predominantly nonocclusive thrombi in the right upper,  middle, and lower lobar arteries with extension into the segmental and  subsegmental pulmonary arterial branches.  On the left there are  predominantly nonocclusive pulmonary emboli in the segmental and  subsegmental pulmonary arteries to the left upper and left lower lobar  pulmonary arteries.     .No evidence of right heart strain. No cardiac thrombus. Main  pulmonary artery is not dilated. Aorta and its major branches appear  patent. Normal caliber of the thoracic great vessels. Normal variant  common origin of the left common carotid and innominate artery.     The central tracheobronchial tree is patent. Few patchy subpleural  foci of groundglass attenuation in the left apex, right upper lobe,  right lower lobe. No pneumothorax or pleural effusion. Few  subcentimeter pulmonary nodules, representative nodule along the " left  major fissure measures 4 mm (series 6 image 130). Homogeneous thyroid  appearance.     The heart size is normal. No pericardial effusion. No thoracic  adenopathy.      Limited views of the abdomen reveal no acute pathology.      Mild degenerative changes of the spine. Sclerotic inferior endplate of  T11, likely degenerative. No suspicious osseous or soft tissue  lesions. Postsurgical changes of bilateral retropectoral breast  implants.                                                                      IMPRESSION:   1. Bilateral pulmonary pulmonary emboli with clot burden slightly  greater on the right. No evidence of right heart strain. Few  subpleural patchy foci of groundglass attenuation, may represent small  pulmonary infarcts versus atelectasis.  2. Few subcentimeter pulmonary nodules, largest along the left major  fissure measures 4 mm. Given small size, no dedicated follow-up is  necessary, unless the patient is considered at high risk for  developing lung cancer in which case follow-up CT in 12 months is  recommended.     Assessment:  In summary, Krystyna Smith is a 38 year old female  with no significant past medical history who developed provoked venous thrombolic event in the setting of hormonal modulation as well as long distance travel.  We discussed  the pathophysiology of VT as well as management strategies she is currently on naproxen without any complications given her no comorbid conditions I think it is reasonable for her to continue on the plan of care.  We discussed the typical duration of therapy is 3-6 months and I would like to see her back at the 3-month timeframe to review ultrasound of her lower extremities to assess the status of the clot as well as check for d-dimer and antiphospholipid antibody testing.  She would hold her Xarelto a day before.   She has already gotten copper IUD and so hormonal component has been excluded.  She travels quite significantly for her work and we  discussed that as long as she is on Xarelto she is covered but once we make a decision that she could go off anticoagulation she would need prophylactic intensity anticoagulation with her travel which is mostly to [facility].     We discussed genetic thrombophilia and with negative family history there is no other test that I would pursue.     PTS : recommended Compression stocking     Copper T : counseled about accentuated bleeding on anticoagulation .     I spent 35 minutes in the care of this patient >50% of which was spent in coordinating and counseling.  Penny Carey   of Medicine   Hematology and medical Oncology   [facility]

## 2019-03-26 DIAGNOSIS — I26.99 OTHER ACUTE PULMONARY EMBOLISM WITHOUT ACUTE COR PULMONALE (H): ICD-10-CM

## 2019-03-26 NOTE — TELEPHONE ENCOUNTER
Requested Prescriptions   Pending Prescriptions Disp Refills     rivaroxaban ANTICOAGULANT (XARELTO) 20 MG TABS tablet  Last Written Prescription Date:  1-20-19  Last Fill Quantity: 30 tab,  # refills: 1   Last office visit: 1/23/2019 with prescribing provider:  Mehreen Dempsey    Future Office Visit:    30 tablet 1     Sig: Take 1 tablet (20 mg) by mouth daily (with dinner) Begin taking 20mg daily after completion of 21 days taking 15mg twice daily.    Direct Oral Anticoagulant Agents Failed - 3/26/2019  2:37 PM       Failed - Creatinine Clearance greater than 50 ml/min on file in past 3 mos    No lab results found.         Passed - Normal Platelets on file in past 12 months    Recent Labs   Lab Test 01/20/19  0604             Passed - Medication is active on med list       Passed - Serum creatinine less than or equal to 1.4 on file in past 3 mos    Recent Labs   Lab Test 01/20/19  0604   CR 0.60          Passed - Patient is 18 years of age or older       Passed - No active pregnancy on record       Passed - No positive pregnancy test within past 12 months       Passed - Recent (6 mo) or future (30 days) visit within the authorizing provider's specialty

## 2019-03-29 NOTE — TELEPHONE ENCOUNTER
Mehreen Dempsey CNP  Protocol failed d/t no creatinine clearance on file????  Creatinine normal though    Please advise.  Thanks!     Faiza Saucedo RN

## 2019-04-01 NOTE — TELEPHONE ENCOUNTER
Patient is calling to check on the status of this. She's completely out and would like a refill sent in today if possible. Please advise, thank you!    Ok to evelin on home/mobile     Cayla [place]

## 2019-04-18 ENCOUNTER — TELEPHONE (OUTPATIENT)
Dept: HEMATOLOGY | Facility: CLINIC | Age: 39
End: 2019-04-18

## 2019-04-18 NOTE — TELEPHONE ENCOUNTER
RN called Krystyna to facilitate ultrasound scheduling. Krystyna has follow up appointment with Dr. Carey on 4/23/19 at 3 pm. We had hoped to schedule a follow up ultrasound prior to this appointment. Krystyna did not answer. RN left message asking Krystyna to either call back or please call and schedule this appointment. RN will follow up with request tomorrow if Krystyna not heard back from sooner.    Addendum  RN called and schedule Krystyna for a follow up [facility] at 1:30pm prior to her visit with Dr. Carey. RN asked that the ultrasound be compared to the previous one and be read quickly if possible. RN then left a voicemail for Krystyna letting her know.    Krystyna did call the [facility] back and confirm she would be able to make this appointment. She also stated she would hold her Xarelto so that lab draws could be done. She agreed to call with any further questions or help she may need accessing her appointments tomorrow.    Alivia Guevara RN - Nurse Clinician - [facility] - [phone]

## 2019-04-23 ENCOUNTER — OFFICE VISIT (OUTPATIENT)
Dept: HEMATOLOGY | Facility: CLINIC | Age: 39
End: 2019-04-23
Attending: INTERNAL MEDICINE
Payer: COMMERCIAL

## 2019-04-23 ENCOUNTER — HOSPITAL ENCOUNTER (OUTPATIENT)
Dept: ULTRASOUND IMAGING | Facility: CLINIC | Age: 39
Discharge: HOME | End: 2019-04-23
Attending: INTERNAL MEDICINE | Admitting: INTERNAL MEDICINE
Payer: COMMERCIAL

## 2019-04-23 VITALS
SYSTOLIC BLOOD PRESSURE: 123 MMHG | DIASTOLIC BLOOD PRESSURE: 82 MMHG | HEART RATE: 66 BPM | OXYGEN SATURATION: 97 % | WEIGHT: 114.4 LBS | RESPIRATION RATE: 12 BRPM | HEIGHT: 61 IN | BODY MASS INDEX: 21.6 KG/M2 | TEMPERATURE: 98.3 F

## 2019-04-23 DIAGNOSIS — I26.99 PE (PULMONARY THROMBOEMBOLISM) (H): ICD-10-CM

## 2019-04-23 DIAGNOSIS — I26.99 PE (PULMONARY THROMBOEMBOLISM) (H): Primary | ICD-10-CM

## 2019-04-23 PROCEDURE — 99213 OFFICE O/P EST LOW 20 MIN: CPT | Performed by: INTERNAL MEDICINE

## 2019-04-23 PROCEDURE — G0463 HOSPITAL OUTPT CLINIC VISIT: HCPCS | Mod: 25

## 2019-04-23 PROCEDURE — 93971 EXTREMITY STUDY: CPT | Mod: LT

## 2019-04-23 ASSESSMENT — PAIN SCALES - GENERAL: PAINLEVEL: NO PAIN (0)

## 2019-04-23 ASSESSMENT — MIFFLIN-ST. JEOR: SCORE: 1136.29

## 2019-05-07 NOTE — PROGRESS NOTES
[facility]  [facility] [place] [address]  Phone: [phone], Fax: [phone]    Outpatient Visit Note:    Patient: Krystyna Smith  MRN: [id number]  : 1980  CHANTELL: 2019    Reason for Consultation:  Krystyna Smith is a referred for evaluation and treatment of VTE .    History of Present Illness:  Krystyna Smith is a 38 year old female with no significant past medical history who has had multiple surgeries and a pregnancy in the past for cosmetic reasons without any thrombotic complications.  presented with approximately 1 week of left calf pain/swelling and the onset of dyspnea on exertion and palpitations since 19 , she she had had a NuvaRing placement a few weeks before as well as a travel to [place] preceding the symptoms she was evaluated in the emergency room underwent a CT scan and ultrasound of lower extremities and was found to have.   Clot burden she was started on Xarelto, and discharged for follow-up in clinic. She had gotten Copper t on the day of last visit    Interval history: pt states that she is back to her baseline otherwise but she has had really heavy periods with Copper T to a point that she is considering getting it taken out, she has not had any further bleeding from elsewhere. She has some nuisance bruising.   She is hoping to get off anticoagulation.      Past Medical History:  Past Medical History:   Diagnosis Date     Headache 2010     Problem list name updated by automated process. Provider to review     NO ACTIVE PROBLEMS        Past Surgical History:  Past Surgical History:   Procedure Laterality Date     C/SECTION, LOW TRANSVERSE      , Low Transverse     HC ENLARGE BREAST WITH IMPLANT  2017    saline     LASIK  2017     VAGINOPLASTY N/A 3/14/2018    Procedure: VAGINOPLASTY;  Revision Of Vaginal Scar Tissue ;  Surgeon: Trina Gaming MD;  Location:  OR        Medications:  Current Outpatient Medications   Medication Sig Dispense Refill     Multiple Vitamins-Minerals (MULTIVITAMIN PO)        paragard intrauterine copper device 1 each by Intrauterine route once Date of insertoin 2/19/19       rivaroxaban ANTICOAGULANT (XARELTO) 20 MG TABS tablet Take 1 tablet (20 mg) by mouth daily (with dinner) Begin taking 20mg daily after completion of 21 days taking 15mg twice daily. 30 tablet 1     TURMERIC PO Take by mouth daily          Allergies:  Allergies   Allergen Reactions     Morphine Hives         Social History:  Denies any tobacco use. No significant alcohol use. Denies any illicit drug use. 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  Vitals: B/P: 133/85, T: 98, P: 86, R: 12, Wt: 114 lbs 6.4 oz  Exam:   Gen: Appears well, no distress  HEENT: No scleral icterus or hemorrahge, no wet purpura, no lymphadenopathy  Ext: trace edema on the left side   MSK: no abn     Labs:  She chose to forego labs     Imaging:  Exam: Ultrasound of the deep venous system of left leg dated 1/19/2019  4:05 PM     Clinical information: Acute left calf pain     Comparison: None     Technique: Gray-scale evaluation with compression and Doppler  assessment of deep venous system for spontaneous and phasic flow, as  well as the presence of distal augmentation. Color flow images  obtained as needed. Gray-scale images with compression of the great  saphenous vein obtained as needed.     Findings:     Right leg:     CFV: Thrombus: No, Phasic: Yes     Left leg:     CFV: Thrombus: No. Phasic: Yes  Femoral vein,  "proximal: Thrombus: No. Phasic: Yes  Femoral vein, mid: Thrombus: No. Phasic: Yes  Femoral vein, distal: Thrombus: yes. Phasic: no. Partially  compressible; somewhat echogenic appearance of thrombus without  convincing vessel expansion  Proximal popliteal vein: Thrombus: yes. Phasic: no. Non compressible;  thrombus is hypoechoic expansile vessel  PTV: Thrombus: No  Peroneal vein: Thrombus: No                                                                      Impression:  1. Nearly occlusive thrombus in the left popliteal vein. Appearance  favors acute age.  2. Nonocclusive thrombus in the peripheral left femoral vein.  Appearance favors subacute age.      Reference: \"Duplex Ultrasound in the Diagnosis of Lower-Extremity Deep  Venous Thrombosis\"- Faiza Mullen MD, S; aJyce Singh MD  (Circulation. 2014;129:917-921. http://circ.ahajournals.org )     [Urgent Result: DVT, recommend evaluation by emergency department]     Finding was identified on 1/19/2019 4:05 PM.    Examination: CT CHEST PULMONARY EMBOLISM W CONTRAST, 1/19/2019 6:51 PM     Comparison: Same-day ultrasound.     History: Shortness of breath; diagnosed with DVT today but reporting  SOB.     Technique: Axial thin slice images from the lung apices to the  diaphragm were obtained following the injection of intravenous  contrast media in the pulmonary arterial phase.      Contrast dose: 50cc of Isovue 370     Radiation Dose (DLP): 171 mGy*cm     Findings:   There is adequate contrast bolus in the study. There are bilateral  pulmonary emboli.   On the right there is a nonocclusive filling  defect in the peripheral right main pulmonary artery. There are  additional predominantly nonocclusive thrombi in the right upper,  middle, and lower lobar arteries with extension into the segmental and  subsegmental pulmonary arterial branches.  On the left there are  predominantly nonocclusive pulmonary emboli in the segmental and  subsegmental pulmonary " arteries to the left upper and left lower lobar  pulmonary arteries.     .No evidence of right heart strain. No cardiac thrombus. Main  pulmonary artery is not dilated. Aorta and its major branches appear  patent. Normal caliber of the thoracic great vessels. Normal variant  common origin of the left common carotid and innominate artery.     The central tracheobronchial tree is patent. Few patchy subpleural  foci of groundglass attenuation in the left apex, right upper lobe,  right lower lobe. No pneumothorax or pleural effusion. Few  subcentimeter pulmonary nodules, representative nodule along the left  major fissure measures 4 mm (series 6 image 130). Homogeneous thyroid  appearance.     The heart size is normal. No pericardial effusion. No thoracic  adenopathy.      Limited views of the abdomen reveal no acute pathology.      Mild degenerative changes of the spine. Sclerotic inferior endplate of  T11, likely degenerative. No suspicious osseous or soft tissue  lesions. Postsurgical changes of bilateral retropectoral breast  implants.                                                                      IMPRESSION:   1. Bilateral pulmonary pulmonary emboli with clot burden slightly  greater on the right. No evidence of right heart strain. Few  subpleural patchy foci of groundglass attenuation, may represent small  pulmonary infarcts versus atelectasis.  2. Few subcentimeter pulmonary nodules, largest along the left major  fissure measures 4 mm. Given small size, no dedicated follow-up is  necessary, unless the patient is considered at high risk for  developing lung cancer in which case follow-up CT in 12 months is  Recommended.    EXAMINATION: Left lower extremity venous Doppler ultrasound.     COMPARISON: Left increased from the venous Doppler ultrasound dated  1/19/2019     HISTORY: PE (pulmonary thromboembolism)     FINDINGS:   The right common femoral is patent. The left common femoral, femoral,  popliteal, and  posterior tibial veins are fully compressible with  patent Doppler wave forms. No thrombus is identified within them on  grayscale imaging.                                                                      IMPRESSION:    No deep venous thrombus demonstrated in left lower extremity. The  previously demonstrated left popliteal and distal femoral vein thrombi  are no longer visualized.        Assessment:  In summary, Krystyna Smith is a 38 year old female  with no significant past medical history who developed provoked venous thrombolic event in the setting of hormonal modulation as well as long distance travel.    In light of resolution of clot and her heavy bleeding in the background of provoked nature of the clot we will stop anticoagulation.   She would need prophylactic intensity anticoagulation with her travel which is mostly to [facility]. She is moving to [place] and she will establish care there for further cares.   Pt decided to forego further labs that were recommended.       PTS : recommended Compression stocking     I spent 25 minutes in the care of this patient >50% of which was spent in coordinating and counseling.  Penny Carey   of Medicine   Hematology and medical Oncology   [facility]

## 2019-11-06 ENCOUNTER — HEALTH MAINTENANCE LETTER (OUTPATIENT)
Age: 39
End: 2019-11-06

## 2020-02-16 ENCOUNTER — HEALTH MAINTENANCE LETTER (OUTPATIENT)
Age: 40
End: 2020-02-16

## 2020-11-29 ENCOUNTER — HEALTH MAINTENANCE LETTER (OUTPATIENT)
Age: 40
End: 2020-11-29

## 2021-03-01 ENCOUNTER — DOCUMENTATION ONLY (OUTPATIENT)
Dept: HEMATOLOGY | Facility: CLINIC | Age: 41
End: 2021-03-01

## 2021-04-10 ENCOUNTER — HEALTH MAINTENANCE LETTER (OUTPATIENT)
Age: 41
End: 2021-04-10

## 2021-09-19 ENCOUNTER — HEALTH MAINTENANCE LETTER (OUTPATIENT)
Age: 41
End: 2021-09-19

## 2022-05-01 ENCOUNTER — HEALTH MAINTENANCE LETTER (OUTPATIENT)
Age: 42
End: 2022-05-01

## 2022-11-21 ENCOUNTER — HEALTH MAINTENANCE LETTER (OUTPATIENT)
Age: 42
End: 2022-11-21

## 2023-06-02 ENCOUNTER — HEALTH MAINTENANCE LETTER (OUTPATIENT)
Age: 43
End: 2023-06-02

## 2024-02-04 ENCOUNTER — HEALTH MAINTENANCE LETTER (OUTPATIENT)
Age: 44
End: 2024-02-04

## (undated) DEVICE — DECANTER TRANSFER DEVICE 2008S

## (undated) DEVICE — ESU GROUND PAD UNIVERSAL W/O CORD

## (undated) DEVICE — GLOVE PROTEXIS W/NEU-THERA 6.0  2D73TE60

## (undated) DEVICE — BLADE KNIFE SURG 15 371115

## (undated) DEVICE — SU VICRYL 2-0 CT-2 27" UND J269H

## (undated) DEVICE — LINEN GOWN X4 5410

## (undated) DEVICE — SUCTION TIP YANKAUER W/O VENT K86

## (undated) DEVICE — SOL WATER IRRIG 1000ML BOTTLE 2F7114

## (undated) DEVICE — ESU ELEC NDL 1" COATED/INSULATED E1465

## (undated) DEVICE — ESU PENCIL W/HOLSTER E2350H

## (undated) DEVICE — GLOVE PROTEXIS BLUE W/NEU-THERA 6.5  2D73EB65

## (undated) DEVICE — STRAP KNEE/BODY 31143004

## (undated) DEVICE — SU VICRYL 4-0 PS-2 18" UND J496H

## (undated) DEVICE — LINEN TOWEL PACK X5 5464

## (undated) DEVICE — SOL NACL 0.9% IRRIG 1000ML BOTTLE 2F7124

## (undated) DEVICE — SU VICRYL 3-0 SH 27" J316H

## (undated) DEVICE — Device

## (undated) DEVICE — PAD CHUX UNDERPAD 30X30"

## (undated) RX ORDER — LIDOCAINE HYDROCHLORIDE 10 MG/ML
INJECTION, SOLUTION EPIDURAL; INFILTRATION; INTRACAUDAL; PERINEURAL
Status: DISPENSED
Start: 2018-03-14

## (undated) RX ORDER — PROPOFOL 10 MG/ML
INJECTION, EMULSION INTRAVENOUS
Status: DISPENSED
Start: 2018-03-14

## (undated) RX ORDER — FENTANYL CITRATE 50 UG/ML
INJECTION, SOLUTION INTRAMUSCULAR; INTRAVENOUS
Status: DISPENSED
Start: 2018-03-14

## (undated) RX ORDER — LIDOCAINE HYDROCHLORIDE 20 MG/ML
INJECTION, SOLUTION EPIDURAL; INFILTRATION; INTRACAUDAL; PERINEURAL
Status: DISPENSED
Start: 2018-03-14

## (undated) RX ORDER — CITRIC ACID/SODIUM CITRATE 334-500MG
SOLUTION, ORAL ORAL
Status: DISPENSED
Start: 2018-03-14

## (undated) RX ORDER — KETOROLAC TROMETHAMINE 30 MG/ML
INJECTION, SOLUTION INTRAMUSCULAR; INTRAVENOUS
Status: DISPENSED
Start: 2018-03-14